# Patient Record
Sex: MALE | Race: WHITE | NOT HISPANIC OR LATINO | Employment: FULL TIME | URBAN - METROPOLITAN AREA
[De-identification: names, ages, dates, MRNs, and addresses within clinical notes are randomized per-mention and may not be internally consistent; named-entity substitution may affect disease eponyms.]

---

## 2017-01-16 ENCOUNTER — TELEPHONE (OUTPATIENT)
Dept: NEUROSURGERY | Facility: CLINIC | Age: 62
End: 2017-01-16

## 2017-01-16 NOTE — TELEPHONE ENCOUNTER
Recall 1Y CT angiogram head and neck for CVA.  Call to pt home and left message 11/23/16 at 2:26pm.  No return call made from patient, so letter mailed to pt on 12/20/16 to call to schedule. No response or appt made to date. No contacts listed in either system for back up.

## 2017-01-16 NOTE — LETTER
1/18/2017     VIA CERTIFIED MAIL    Khadar Vann  [address] [place] [address]    Dear Mr. Vann,    You have a serious medical condition: bilateral carotid stenosis that has been treated surgically. It was recommended for you to undergo repeat testing to continue to observe this for any changes. My staff has attempted to contact you on multiple occasions to arrange this important follow-up but you have not responded.    I continue to recommend follow-up in my office with repeat imaging.    It is my job as your neurosurgeon, once a diagnosis like this has been made, to watch a patient carefully for a period of time to ensure their continued good health and be able to treat any changes as they arise. I do this for all of my patients who carry a similar diagnosis. In the end, it is always the patient’s choice about how they manage their health and I certainly respect your decision to refrain from further testing and follow-up.  If you prefer not to continue neurosurgical care with me, please place yourself under the care of another physician without delay. If you have decided to obtain care with another provider, please let us know and we can help facilitate getting your medical records to your new provider.       Sincerely,           Uzair Odom M.D.  IRINA/crow

## 2017-02-13 ENCOUNTER — TELEPHONE (OUTPATIENT)
Dept: NEUROSURGERY | Facility: CLINIC | Age: 62
End: 2017-02-13

## 2017-02-13 DIAGNOSIS — Z86.73 HISTORY OF STROKE: ICD-10-CM

## 2017-02-13 DIAGNOSIS — I65.23 BILATERAL CAROTID ARTERY STENOSIS: Primary | ICD-10-CM

## 2017-02-13 NOTE — TELEPHONE ENCOUNTER
----- Message from Daisy Hobson sent at 2/8/2017  2:08 PM EST -----  Regarding: Recall  We sent this patient a [facility] letter to get his recall scheduled: Recall 1Y CT angiogram head and neck for CVA.     There is no order in chart - can you please enter so I can go ahead and schedule this patient's test and FU.

## 2017-04-07 ENCOUNTER — OFFICE VISIT (OUTPATIENT)
Dept: NEUROSURGERY | Facility: CLINIC | Age: 62
End: 2017-04-07

## 2017-04-07 ENCOUNTER — HOSPITAL ENCOUNTER (OUTPATIENT)
Dept: CT IMAGING | Facility: HOSPITAL | Age: 62
Discharge: HOME | End: 2017-04-07
Admitting: NURSE PRACTITIONER

## 2017-04-07 VITALS
SYSTOLIC BLOOD PRESSURE: 112 MMHG | WEIGHT: 237 LBS | BODY MASS INDEX: 33.93 KG/M2 | HEIGHT: 70 IN | HEART RATE: 76 BPM | DIASTOLIC BLOOD PRESSURE: 60 MMHG | RESPIRATION RATE: 16 BRPM

## 2017-04-07 DIAGNOSIS — I65.23 BILATERAL CAROTID ARTERY STENOSIS: ICD-10-CM

## 2017-04-07 DIAGNOSIS — Z86.73 HISTORY OF STROKE: ICD-10-CM

## 2017-04-07 DIAGNOSIS — I65.23 ASYMPTOMATIC CAROTID ARTERY NARROWING WITHOUT INFARCTION, BILATERAL: Primary | ICD-10-CM

## 2017-04-07 DIAGNOSIS — I70.8 LEFT SUBCLAVIAN ARTERY OCCLUSION: ICD-10-CM

## 2017-04-07 PROBLEM — I65.29 ASYMPTOMATIC CAROTID ARTERY NARROWING WITHOUT INFARCTION: Status: ACTIVE | Noted: 2017-04-07

## 2017-04-07 LAB — CREAT BLDA-MCNC: 1.8 MG/DL (ref 0.6–1.3)

## 2017-04-07 PROCEDURE — 99213 OFFICE O/P EST LOW 20 MIN: CPT | Performed by: NEUROLOGICAL SURGERY

## 2017-04-07 PROCEDURE — 82565 ASSAY OF CREATININE: CPT

## 2017-04-07 PROCEDURE — 70498 CT ANGIOGRAPHY NECK: CPT

## 2017-04-07 PROCEDURE — 70496 CT ANGIOGRAPHY HEAD: CPT

## 2017-04-07 PROCEDURE — 0 IOPAMIDOL PER 1 ML: Performed by: NURSE PRACTITIONER

## 2017-04-07 RX ORDER — ASPIRIN 81 MG/1
81 TABLET ORAL DAILY
COMMUNITY

## 2017-04-07 RX ORDER — SPIRONOLACTONE AND HYDROCHLOROTHIAZIDE 25; 25 MG/1; MG/1
1 TABLET ORAL DAILY
Refills: 0 | COMMUNITY
Start: 2017-03-17 | End: 2021-05-03

## 2017-04-07 RX ORDER — PANTOPRAZOLE SODIUM 40 MG/1
40 TABLET, DELAYED RELEASE ORAL DAILY
COMMUNITY
Start: 2017-03-25

## 2017-04-07 RX ORDER — AMLODIPINE BESYLATE AND BENAZEPRIL HYDROCHLORIDE 5; 20 MG/1; MG/1
CAPSULE ORAL
COMMUNITY
Start: 2017-03-23 | End: 2021-01-15 | Stop reason: SDUPTHER

## 2017-04-07 RX ORDER — BISOPROLOL FUMARATE AND HYDROCHLOROTHIAZIDE 5; 6.25 MG/1; MG/1
1 TABLET ORAL DAILY
COMMUNITY
Start: 2017-03-25 | End: 2021-02-26 | Stop reason: SDUPTHER

## 2017-04-07 RX ORDER — ROSUVASTATIN CALCIUM 20 MG/1
20 TABLET, COATED ORAL DAILY
COMMUNITY
Start: 2017-03-23 | End: 2021-02-26 | Stop reason: SDUPTHER

## 2017-04-07 RX ADMIN — IOPAMIDOL 95 ML: 755 INJECTION, SOLUTION INTRAVENOUS at 09:30

## 2017-04-07 NOTE — PROGRESS NOTES
Subjective   Patient ID: Khadar Vann is a 62 y.o. male is here today for follow-up of bilateral carotid stenosis s/p (R) CEA 3-31-10 and (L) CEA with subclavian by-pass 4-26-10. He is unaccompanied.    History of Present Illness     62 year old gentleman who returns the office now approximately 7 years following bilateral carotid artery endarterectomies and a left carotid to subclavian artery bypass in April 2010.  Clinically he remains essentially unchanged.  He has had a disturbing sensation of near syncope when straining at stool.  He is not yet completely passed out but he comes close.  With straining he feels dizzy.  He has also seen sparkling lights when this has happened.  He also gets a sense of the same feeling when he stretches his arms.:  Pulling and tugging also causes a problem.    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      The following portions of the patient's history were reviewed and updated as appropriate: allergies, current medications, past family history, past medical history, past social history, past surgical history and problem list.    Review of Systems   Cardiovascular: Negative for chest pain and palpitations.   Musculoskeletal: Negative for gait problem.   Neurological: Positive for light-headedness (with valsalva maneuvers - feels like he is going to pass out). Negative for dizziness, speech difficulty, weakness and headaches.   Psychiatric/Behavioral: Negative for confusion.       Objective   Physical Exam   Constitutional: He is oriented to person, place, and time. He appears well-developed and well-nourished. He is cooperative.   HENT:   Head: Normocephalic and atraumatic.   Eyes: EOM are normal. Pupils are equal, round, and reactive to light. No scleral icterus.   Neck: Normal range of motion. Neck supple.   Cardiovascular: Normal rate, regular rhythm and intact distal pulses.    No murmur heard.  Pulses:       Carotid pulses are 2+ on the right  side, and 2+ on the left side.       Radial pulses are 0 on the left side.        Femoral pulses are 2+ on the right side, and 2+ on the left side.       Popliteal pulses are 1+ on the right side, and 1+ on the left side.   Pulmonary/Chest: Effort normal and breath sounds normal.   Abdominal: Soft. Bowel sounds are normal. There is no tenderness.   Musculoskeletal: Normal range of motion.   Neurological: He is alert and oriented to person, place, and time. He has normal strength. He displays no atrophy. No cranial nerve deficit or sensory deficit. He exhibits normal muscle tone. He displays a negative Romberg sign. Coordination and gait normal. GCS eye subscore is 4. GCS verbal subscore is 5. GCS motor subscore is 6.   Reflex Scores:       Tricep reflexes are 2+ on the right side and 2+ on the left side.       Bicep reflexes are 2+ on the right side and 2+ on the left side.       Brachioradialis reflexes are 2+ on the right side and 2+ on the left side.       Patellar reflexes are 2+ on the right side and 2+ on the left side.       Achilles reflexes are 2+ on the right side and 2+ on the left side.  Negative Grant and clonus  Finger to nose intact   Heel to shin intact  Able to tandem walk  Able to walk on heels and toes   Skin: Skin is warm, dry and intact.   Psychiatric: He has a normal mood and affect. His speech is normal and behavior is normal. Judgment and thought content normal. Cognition and memory are normal.   Vitals reviewed.    Neurologic Exam     Mental Status   Oriented to person, place, and time.   Speech: speech is normal     Cranial Nerves     CN III, IV, VI   Pupils are equal, round, and reactive to light.  Extraocular motions are normal.     Motor Exam     Strength   Strength 5/5 throughout.     Gait, Coordination, and Reflexes     Reflexes   Right brachioradialis: 2+  Left brachioradialis: 2+  Right biceps: 2+  Left biceps: 2+  Right triceps: 2+  Left triceps: 2+  Right patellar: 2+  Left  patellar: 2+  Right achilles: 2+  Left achilles: 2+      Assessment/Plan   Independent Review of Radiographic Studies:    I personally reviewed the CTA of the head and neck done today.  This demonstrates minimal carotid stenosis bilaterally.  It also demonstrates the presence of the left carotid to subclavian graft which remains occluded.  There is distal filling of the subclavian artery.  Medical Decision Making:    The patient returns to the office with the above-noted history and physical findings.  He is undergone successful carotid artery surgery with satisfactory revascularization and no evidence of significant restenosis at this time.  Unfortunately the left carotid to subclavian graft is likely nonfunctional or at least only poorly functional since he continues to have symptoms in the left upper extremity.    He has a more disturbing symptoms as described above of near syncope.  I have suggested that he be in contact with his cardiologist and we will go ahead and make an appointment for him while he is here for further evaluation of these symptoms.    I recommended that he follow-up here in 2 years with a carotid ultrasound tests rather than further CTA imaging.  I don't think that he requires further radiation unless he were to develop other symptoms.      Khadar was seen today for carotid stenosis follow-up.    Diagnoses and all orders for this visit:    Asymptomatic carotid artery narrowing without infarction, bilateral  -     Duplex Carotid Ultrasound CAR; Future    Left subclavian artery occlusion  -     Duplex Carotid Ultrasound CAR; Future      Return in about 2 years (around 4/7/2019).

## 2019-02-15 ENCOUNTER — TELEPHONE (OUTPATIENT)
Dept: NEUROSURGERY | Facility: CLINIC | Age: 64
End: 2019-02-15

## 2019-02-15 DIAGNOSIS — I65.23 ASYMPTOMATIC STENOSIS OF BOTH CAROTID ARTERIES WITHOUT INFARCTION: Primary | ICD-10-CM

## 2019-02-15 NOTE — TELEPHONE ENCOUNTER
Due in April for 2Y recall carotid stenosis with carotid doppler. Old  order deleted, new order in, old records scanned, old imaging loaded.    Recall letter sent.

## 2019-02-19 NOTE — TELEPHONE ENCOUNTER
Pt is scheduled for 4/9 for his doppler and his f/u.   Pt called and said he wants to do his carotid doppler and see Dr Odom the same day. Pt wants to get his results the same day.

## 2019-04-09 ENCOUNTER — APPOINTMENT (OUTPATIENT)
Dept: CARDIOLOGY | Facility: HOSPITAL | Age: 64
End: 2019-04-09

## 2019-07-09 ENCOUNTER — OFFICE VISIT (OUTPATIENT)
Dept: NEUROSURGERY | Facility: CLINIC | Age: 64
End: 2019-07-09

## 2019-07-09 VITALS
HEART RATE: 56 BPM | RESPIRATION RATE: 16 BRPM | SYSTOLIC BLOOD PRESSURE: 110 MMHG | DIASTOLIC BLOOD PRESSURE: 64 MMHG | HEIGHT: 70 IN | WEIGHT: 227 LBS | BODY MASS INDEX: 32.5 KG/M2

## 2019-07-09 DIAGNOSIS — I65.23 ASYMPTOMATIC STENOSIS OF BOTH CAROTID ARTERIES WITHOUT INFARCTION: Primary | ICD-10-CM

## 2019-07-09 DIAGNOSIS — I70.8 LEFT SUBCLAVIAN ARTERY OCCLUSION: ICD-10-CM

## 2019-07-09 DIAGNOSIS — I63.411 CEREBROVASCULAR ACCIDENT (CVA) DUE TO EMBOLISM OF RIGHT MIDDLE CEREBRAL ARTERY (HCC): ICD-10-CM

## 2019-07-09 DIAGNOSIS — I65.23 ASYMPTOMATIC STENOSIS OF BOTH CAROTID ARTERIES WITHOUT INFARCTION: ICD-10-CM

## 2019-07-09 PROBLEM — I63.9 STROKE (HCC): Status: RESOLVED | Noted: 2019-07-09 | Resolved: 2019-07-09

## 2019-07-09 PROCEDURE — 99213 OFFICE O/P EST LOW 20 MIN: CPT | Performed by: NEUROLOGICAL SURGERY

## 2019-07-09 NOTE — PROGRESS NOTES
"Subjective   Patient ID: Khadar Vann is a 64 y.o. male is here today for follow-up of 2yr recall carotid stenosis .  Pt is unaccompanied.    History of Present Illness     He returns to the office today for ongoing follow-up with history of bilateral carotid stenosis status post right carotid endarterectomy in March 2010.  He then underwent a left carotid endarterectomy with subclavian bypass in April 2010.  He presents with a new carotid ultrasound obtained at [facility] on June 4, 2018.    He denies any new issues at this time.  He reports occasional dizziness after working prolonged hours.  He denies any double or blurred vision, headaches or lightheadedness.  He denies any strokelike symptoms.  He does have some residual weakness in the left upper extremity and occasional swelling in the left arm.  He continues to work full-time at [employer].  He states he has lost approximately 15 pounds in the past few months due to walking more at work.     He presents unaccompanied.       /64 (BP Location: Right arm, Patient Position: Sitting, Cuff Size: Large Adult)   Pulse 56   Resp 16   Ht 177.8 cm (70\")   Wt 103 kg (227 lb)   BMI 32.57 kg/m²     The following portions of the patient's history were reviewed and updated as appropriate: allergies, current medications, past family history, past medical history, past social history, past surgical history and problem list.    Review of Systems   HENT: Positive for tinnitus.    Eyes: Negative for visual disturbance.   Neurological: Positive for dizziness, light-headedness and numbness (L arm/hand). Negative for syncope, facial asymmetry and headaches.       Objective   Physical Exam   Constitutional: He is oriented to person, place, and time. Vital signs are normal. He appears well-developed and well-nourished. He is cooperative.  Non-toxic appearance. He does not have a sickly appearance.   HENT:   Head: Atraumatic.   Eyes: EOM are " normal. Pupils are equal, round, and reactive to light.   Neck: Normal range of motion. Neck supple. Carotid bruit is present (Bilateral). No tracheal deviation present.   Well-healed bilateral carotid incision site   Cardiovascular: Intact distal pulses.   Pulmonary/Chest: Effort normal.   Abdominal:   Inguinal hernia   Musculoskeletal: Normal range of motion. He exhibits no deformity.   Moving all extremities well   Neurological: He is alert and oriented to person, place, and time. He has normal strength. He displays no tremor. No cranial nerve deficit or sensory deficit. He exhibits normal muscle tone. Coordination and gait normal. GCS eye subscore is 4. GCS verbal subscore is 5. GCS motor subscore is 6.   Gait is stable and upright, able to heel and toe walk, able to tandem  Cranial nerves II through XII grossly intact   Skin: Skin is warm and dry.   Psychiatric: He has a normal mood and affect. His behavior is normal. Thought content normal.   Vitals reviewed.    Neurologic Exam     Mental Status   Oriented to person, place, and time.     Cranial Nerves     CN III, IV, VI   Pupils are equal, round, and reactive to light.  Extraocular motions are normal.     Motor Exam     Strength   Strength 5/5 throughout.       Assessment/Plan   Independent Review of Radiographic Studies:    I reviewed the carotid duplex evaluation demonstrates about 40% right internal carotid artery stenosis and about 40 to 60% left internal carotid artery stenosis.  This is unchanged from prior duplex evaluation.  Medical Decision Making:    I confirmed and obtained the above history as recorded by the nurse practitioner acting as a scribe. I performed the above examination and it is documented by the nurse practitioner acting as a scribe.    This very pleasant patient returns the office today thankfully clinically unchanged.  He has noted some very mild memory dysfunction and at this point he does not really want to do anything about that  more specific.    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    I plan on seeing him in 2 years with a CTA of the head and neck to confirm that everything looks as good as it did 2 years ago.    From a cerebrovascular standpoint I believe that he is low risk for a simple hernia repair.  I do think that care should be taken to maintain blood pressure throughout the operative intervention.    Khadar was seen today for 2yr recall carotid stenosis.    Diagnoses and all orders for this visit:    Asymptomatic stenosis of both carotid arteries without infarction  -     CT Angiogram Neck With & Without Contrast; Future    Left subclavian artery occlusion  -     CT Angiogram Neck With & Without Contrast; Future    Cerebrovascular accident (CVA) due to embolism of right middle cerebral artery (CMS/HCC)  -     CT Angiogram Head With Contrast; Future      Return in about 2 years (around 7/9/2021) for Follow up after testing.

## 2021-01-15 RX ORDER — AMLODIPINE BESYLATE AND BENAZEPRIL HYDROCHLORIDE 5; 20 MG/1; MG/1
1 CAPSULE ORAL DAILY
Qty: 30 CAPSULE | Refills: 0 | Status: SHIPPED | OUTPATIENT
Start: 2021-01-15 | End: 2021-02-26 | Stop reason: SDUPTHER

## 2021-01-15 NOTE — TELEPHONE ENCOUNTER
Dr. Serrano:     Last Office Visit: 07/10/2020 ( Tamie )  Last Labs: 01/30/2020  Next Lab Visit: -   Next Office Visit: -      :  Please book patient for a follow up appointment for labs AND office visit.

## 2021-01-19 ENCOUNTER — OFFICE VISIT (OUTPATIENT)
Dept: CARDIOLOGY | Facility: CLINIC | Age: 66
End: 2021-01-19

## 2021-01-19 VITALS
TEMPERATURE: 98 F | DIASTOLIC BLOOD PRESSURE: 64 MMHG | BODY MASS INDEX: 32.2 KG/M2 | HEIGHT: 71 IN | HEART RATE: 62 BPM | WEIGHT: 230 LBS | RESPIRATION RATE: 16 BRPM | OXYGEN SATURATION: 96 % | SYSTOLIC BLOOD PRESSURE: 120 MMHG

## 2021-01-19 DIAGNOSIS — I65.23 BILATERAL CAROTID ARTERY STENOSIS: Primary | ICD-10-CM

## 2021-01-19 DIAGNOSIS — E78.5 HYPERLIPIDEMIA LDL GOAL <100: ICD-10-CM

## 2021-01-19 DIAGNOSIS — I70.8 LEFT SUBCLAVIAN ARTERY OCCLUSION: ICD-10-CM

## 2021-01-19 DIAGNOSIS — I10 ESSENTIAL HYPERTENSION: ICD-10-CM

## 2021-01-19 PROBLEM — I77.9 CAROTID DISEASE, BILATERAL: Status: ACTIVE | Noted: 2017-04-07

## 2021-01-19 PROCEDURE — 93000 ELECTROCARDIOGRAM COMPLETE: CPT | Performed by: INTERNAL MEDICINE

## 2021-01-19 PROCEDURE — 99214 OFFICE O/P EST MOD 30 MIN: CPT | Performed by: INTERNAL MEDICINE

## 2021-01-19 NOTE — PROGRESS NOTES
[facility] [place]  [facility] CARDIOLOGY  [address] [place] [address]  Dept: [phone]  Dept Fax: [phone]    Khadar Vann  1955    Follow Up Office Visit Note    History of Present Illness:  Khadar Vann is a 65 y.o. male who presents to the clinic for Hypertension. The BP seems fine 120.70 on Amlodipine benazepril 5.20. Bisopril Hctz 5/6.25 and Aldactizide 25/25., will keep same meds get lab today, he has CRF last creatinine was 1,8 in 2018    The following portions of the patient's history were reviewed and updated as appropriate: allergies, current medications, past family history, past medical history, past social history, past surgical history and problem list.    Medications:  amLODIPine-benazepril  aspirin  bisoprolol-hydrochlorothiazide  pantoprazole  rosuvastatin  spironolactone-hydrochlorothiazide    Subjective  No Known Allergies     Past Medical History:   Diagnosis Date   • Atherosclerosis of arteries of extremities (CMS/HCC)    • Benign essential hypertension    • Bilateral carotid artery stenosis    • Body mass index (BMI) 33.0-33.9, adult    • Diabetes (CMS/HCC)    • Dyslipidemia    • GERD (gastroesophageal reflux disease)    • Hyperlipidemia    • Hypertension    • Impaired fasting glucose    • Mixed hypercholesterolemia and hypertriglyceridemia    • Multiple and bilateral precerebral arterial occlusion    • Multiple and bilateral precerebral artery stenosis    • Occlusion and stenosis of bilateral carotid arteries    • Peripheral vascular disease (CMS/HCC)    • Pure hypercholesterolemia    • Stroke (CMS/HCC) 2010   • Tobacco abuse        Past Surgical History:   Procedure Laterality Date   • CAROTID ENDARTERECTOMY Right 03/31/2010   • CAROTID ENDARTERECTOMY Left 04/26/2010    with subclavian by-pass   • SHOULDER ARTHROSCOPY Left        Family History   Problem Relation Age of Onset   • Heart disease Father    • Hypertension Father    • Diabetes  "Father    • Cancer Brother         lung   • Diabetes Sister         Social History     Socioeconomic History   • Marital status:      Spouse name: Not on file   • Number of children: Not on file   • Years of education: Not on file   • Highest education level: Not on file   Occupational History   • Occupation: line set up     Comment: full time   Tobacco Use   • Smoking status: Former Smoker     Quit date:      Years since quittin.0   • Smokeless tobacco: Never Used   Substance and Sexual Activity   • Alcohol use: No   • Drug use: No   • Sexual activity: Defer       Review of Systems   Constitutional: Negative.    HENT: Negative.    Respiratory: Negative.    Cardiovascular: Negative.    Endocrine: Negative.    Genitourinary: Negative.    Musculoskeletal: Negative.    Skin: Negative.    Allergic/Immunologic: Negative.    Neurological: Negative.    Hematological: Negative.    Psychiatric/Behavioral: Negative.        Cardiovascular Procedures    ECHO/MUGA: ECHOCARDIOGRAM - SCAN - [id number]- (2021)    STRESS TESTS:   CARDIAC CATH:   DEVICES:   HOLTER:   CT/MRI:   VASCULAR: CARDIOLOGY RESULTS - SCAN - [id number]- (2021)    CARDIOTHORACIC:     Objective  Vitals:    21 1439   BP: 120/64   BP Location: Right arm   Patient Position: Lying   Cuff Size: Large Adult   Pulse: 62   Resp: 16   Temp: 98 °F (36.7 °C)   TempSrc: Infrared   SpO2: 96%   Weight: 104 kg (230 lb)   Height: 180.3 cm (71\")   PainSc: 0-No pain     Body mass index is 32.08 kg/m².     Physical Exam  Cardiovascular:      PMI at left midclavicular line. Normal rate. Regular rhythm. Normal S1. Normal S2.      Murmurs: There is no murmur.      No gallop. No click. No rub.   Pulses:     Intact distal pulses.   Edema:     Peripheral edema absent.          Diagnostic Data    ECG 12 Lead    Date/Time: 2021 3:37 PM  Performed by: Geo Serrano MD  Authorized by: Geo Serrano MD   Previous " ECG: no previous ECG available  Rhythm: sinus rhythm  Rate: normal  BPM: 63  QRS axis: normal  Other findings: low voltage            Assessment and Plan  Diagnoses and all orders for this visit:    Bilateral carotid artery stenosis= bilateral carotid endarterectomy in 2010 and recent doppler less than 40 % on ASA    Essential hypertension- BP seems fine, will keep Amlodipine benazepril 5.,20. Bisoprolol 5.,625 daily and also Aldactizyde,     Hyperlipidemia LDL goal <100- on Crestor 20 mg    Left subclavian artery occlusion- he seems he has bypass from  Carotid to left brachial artery          Return in about 6 months (around 7/19/2021) for Recheck.    Geo Serrano MD  01/19/2021

## 2021-01-20 LAB
BASOPHILS # BLD AUTO: 0.1 X10E3/UL (ref 0–0.2)
BASOPHILS NFR BLD AUTO: 1 %
EOSINOPHIL # BLD AUTO: 0.2 X10E3/UL (ref 0–0.4)
EOSINOPHIL NFR BLD AUTO: 2 %
ERYTHROCYTE [DISTWIDTH] IN BLOOD BY AUTOMATED COUNT: 13 % (ref 11.6–15.4)
HCT VFR BLD AUTO: 43.1 % (ref 37.5–51)
HGB BLD-MCNC: 14.6 G/DL (ref 13–17.7)
IMM GRANULOCYTES # BLD AUTO: 0 X10E3/UL (ref 0–0.1)
IMM GRANULOCYTES NFR BLD AUTO: 0 %
LYMPHOCYTES # BLD AUTO: 1.8 X10E3/UL (ref 0.7–3.1)
LYMPHOCYTES NFR BLD AUTO: 17 %
Lab: NORMAL
MCH RBC QN AUTO: 28.6 PG (ref 26.6–33)
MCHC RBC AUTO-ENTMCNC: 33.9 G/DL (ref 31.5–35.7)
MCV RBC AUTO: 84 FL (ref 79–97)
MONOCYTES # BLD AUTO: 0.8 X10E3/UL (ref 0.1–0.9)
MONOCYTES NFR BLD AUTO: 8 %
NEUTROPHILS # BLD AUTO: 7.2 X10E3/UL (ref 1.4–7)
NEUTROPHILS NFR BLD AUTO: 72 %
PLATELET # BLD AUTO: 299 X10E3/UL (ref 150–450)
RBC # BLD AUTO: 5.11 X10E6/UL (ref 4.14–5.8)
WBC # BLD AUTO: 10.1 X10E3/UL (ref 3.4–10.8)

## 2021-01-21 LAB
ALBUMIN SERPL-MCNC: 4.7 G/DL (ref 3.8–4.8)
ALBUMIN/GLOB SERPL: 1.7 {RATIO} (ref 1.2–2.2)
ALP SERPL-CCNC: 78 IU/L (ref 39–117)
ALT SERPL-CCNC: 11 IU/L (ref 0–44)
AST SERPL-CCNC: 20 IU/L (ref 0–40)
BILIRUB SERPL-MCNC: <0.2 MG/DL (ref 0–1.2)
BUN SERPL-MCNC: 26 MG/DL (ref 8–27)
BUN/CREAT SERPL: 14 (ref 10–24)
CALCIUM SERPL-MCNC: 10.6 MG/DL (ref 8.6–10.2)
CHLORIDE SERPL-SCNC: 103 MMOL/L (ref 96–106)
CO2 SERPL-SCNC: 20 MMOL/L (ref 20–29)
CREAT SERPL-MCNC: 1.84 MG/DL (ref 0.76–1.27)
GLOBULIN SER CALC-MCNC: 2.7 G/DL (ref 1.5–4.5)
GLUCOSE SERPL-MCNC: 89 MG/DL (ref 65–99)
POTASSIUM SERPL-SCNC: 4.8 MMOL/L (ref 3.5–5.2)
PROT SERPL-MCNC: 7.4 G/DL (ref 6–8.5)
SODIUM SERPL-SCNC: 141 MMOL/L (ref 134–144)
WRITTEN AUTHORIZATION: NORMAL

## 2021-01-22 ENCOUNTER — TELEPHONE (OUTPATIENT)
Dept: CARDIOLOGY | Facility: CLINIC | Age: 66
End: 2021-01-22

## 2021-02-12 DIAGNOSIS — I10 ESSENTIAL HYPERTENSION: ICD-10-CM

## 2021-02-12 DIAGNOSIS — E78.5 HYPERLIPIDEMIA LDL GOAL <100: Primary | ICD-10-CM

## 2021-02-12 RX ORDER — ROSUVASTATIN CALCIUM 20 MG/1
20 TABLET, COATED ORAL DAILY
Qty: 90 TABLET | Refills: 3 | Status: SHIPPED | OUTPATIENT
Start: 2021-02-12 | End: 2021-08-05

## 2021-02-26 DIAGNOSIS — E78.5 HYPERLIPIDEMIA LDL GOAL <100: ICD-10-CM

## 2021-02-26 DIAGNOSIS — I10 ESSENTIAL HYPERTENSION: Primary | ICD-10-CM

## 2021-02-26 RX ORDER — AMLODIPINE BESYLATE AND BENAZEPRIL HYDROCHLORIDE 5; 20 MG/1; MG/1
1 CAPSULE ORAL DAILY
Qty: 90 CAPSULE | Refills: 3 | Status: SHIPPED | OUTPATIENT
Start: 2021-02-26 | End: 2022-02-18

## 2021-02-26 RX ORDER — ROSUVASTATIN CALCIUM 20 MG/1
20 TABLET, COATED ORAL DAILY
Qty: 90 TABLET | Refills: 3 | Status: SHIPPED | OUTPATIENT
Start: 2021-02-26 | End: 2021-08-09 | Stop reason: SDUPTHER

## 2021-02-26 RX ORDER — BISOPROLOL FUMARATE AND HYDROCHLOROTHIAZIDE 5; 6.25 MG/1; MG/1
1 TABLET ORAL DAILY
Qty: 90 TABLET | Refills: 3 | Status: SHIPPED | OUTPATIENT
Start: 2021-02-26 | End: 2022-02-18

## 2021-02-26 NOTE — TELEPHONE ENCOUNTER
HE SAID HE GOT THE MESSAGE TO CALL HIS PHARMACY, BUT THEY HAVE MADE MULTIPLE ATTEMPTS, AND HE HAS MADE MULTIPLE ATTEMPTS TO GET NEW PRESCRIPTION REFILLS, AND THEY STILL HAVEN'T BEEN SENT,  90 DAYS ON BISOPROLOL 5MG, CRESTOR 20MG, AND AMLODIPINE-BENZAPRIL 5/20 TO [facility]

## 2021-03-05 ENCOUNTER — TELEPHONE (OUTPATIENT)
Dept: CARDIOLOGY | Facility: CLINIC | Age: 66
End: 2021-03-05

## 2021-03-05 NOTE — TELEPHONE ENCOUNTER
[facility] CALLING TO GET CARDIAC CLEARANCE FOR OPEN UMBILICAL HERNIA REPAIR ....  PLEASE CALL [facility] / OTILIA @   [phone]

## 2021-03-05 NOTE — TELEPHONE ENCOUNTER
[facility] CALL[facility] TO GET CARDIAC CLEARANCE FOR OPEN UMBILICAL HERNIA REPAIR . Please advise?

## 2021-03-08 ENCOUNTER — TELEPHONE (OUTPATIENT)
Dept: CARDIOLOGY | Facility: CLINIC | Age: 66
End: 2021-03-08

## 2021-03-08 NOTE — TELEPHONE ENCOUNTER
Sarahi at [facility] calling to get cardiac clearance note from provider.  Need clearance note in order to schedule procedure.  Pls call her back at [phone].

## 2021-05-01 DIAGNOSIS — I70.8 LEFT SUBCLAVIAN ARTERY OCCLUSION: Primary | ICD-10-CM

## 2021-05-03 RX ORDER — SPIRONOLACTONE AND HYDROCHLOROTHIAZIDE 25; 25 MG/1; MG/1
TABLET ORAL
Qty: 90 TABLET | Refills: 3 | Status: SHIPPED | OUTPATIENT
Start: 2021-05-03 | End: 2022-05-09 | Stop reason: SDUPTHER

## 2021-05-20 ENCOUNTER — TELEPHONE (OUTPATIENT)
Dept: NEUROSURGERY | Facility: CLINIC | Age: 66
End: 2021-05-20

## 2021-05-20 DIAGNOSIS — I65.23 BILATERAL CAROTID ARTERY STENOSIS: Primary | ICD-10-CM

## 2021-07-21 ENCOUNTER — TELEPHONE (OUTPATIENT)
Dept: NEUROSURGERY | Facility: CLINIC | Age: 66
End: 2021-07-21

## 2021-08-05 ENCOUNTER — TELEPHONE (OUTPATIENT)
Dept: CARDIOLOGY | Facility: CLINIC | Age: 66
End: 2021-08-05

## 2021-08-05 ENCOUNTER — OFFICE VISIT (OUTPATIENT)
Dept: CARDIOLOGY | Facility: CLINIC | Age: 66
End: 2021-08-05

## 2021-08-05 VITALS
WEIGHT: 230 LBS | RESPIRATION RATE: 15 BRPM | DIASTOLIC BLOOD PRESSURE: 68 MMHG | HEART RATE: 59 BPM | OXYGEN SATURATION: 97 % | TEMPERATURE: 97.1 F | HEIGHT: 71 IN | SYSTOLIC BLOOD PRESSURE: 116 MMHG | BODY MASS INDEX: 32.2 KG/M2

## 2021-08-05 DIAGNOSIS — I70.8 LEFT SUBCLAVIAN ARTERY OCCLUSION: ICD-10-CM

## 2021-08-05 DIAGNOSIS — E78.5 HYPERLIPIDEMIA LDL GOAL <100: ICD-10-CM

## 2021-08-05 DIAGNOSIS — I10 ESSENTIAL HYPERTENSION: ICD-10-CM

## 2021-08-05 DIAGNOSIS — I65.23 BILATERAL CAROTID ARTERY STENOSIS: Primary | ICD-10-CM

## 2021-08-05 PROCEDURE — 99214 OFFICE O/P EST MOD 30 MIN: CPT | Performed by: INTERNAL MEDICINE

## 2021-08-05 NOTE — PROGRESS NOTES
[facility] [place]  [facility] CARDIOLOGY  [address] [place] [address]  Dept: [phone]  Dept Fax: [phone]    Khadar Vann  1955    Follow Up Office Visit Note    History of Present Illness:  Khadar Vann is a 66 y.o. male who presents to the clinic for Hypertension - the BP is good 120.60 on Aldactazide 25/25,Bisoprolol 6.,625 daily and Amlodipine benazepril 5,20, daily, denies any complaints     The following portions of the patient's history were reviewed and updated as appropriate: allergies, current medications, past family history, past medical history, past social history, past surgical history and problem list.    Medications:  amLODIPine-benazepril  aspirin  bisoprolol-hydrochlorothiazide  pantoprazole  rosuvastatin  spironolactone-hydrochlorothiazide    Subjective  No Known Allergies     Past Medical History:   Diagnosis Date   • Atherosclerosis of arteries of extremities (CMS/HCC)    • Benign essential hypertension    • Bilateral carotid artery stenosis    • Body mass index (BMI) 33.0-33.9, adult    • Diabetes (CMS/HCC)    • Dyslipidemia    • GERD (gastroesophageal reflux disease)    • Hyperlipidemia    • Hypertension    • Impaired fasting glucose    • Mixed hypercholesterolemia and hypertriglyceridemia    • Multiple and bilateral precerebral arterial occlusion    • Multiple and bilateral precerebral artery stenosis    • Occlusion and stenosis of bilateral carotid arteries    • Peripheral vascular disease (CMS/HCC)    • Pure hypercholesterolemia    • Stroke (CMS/HCC) 2010   • Tobacco abuse        Past Surgical History:   Procedure Laterality Date   • CAROTID ENDARTERECTOMY Right 03/31/2010   • CAROTID ENDARTERECTOMY Left 04/26/2010    with subclavian by-pass   • SHOULDER ARTHROSCOPY Left        Family History   Problem Relation Age of Onset   • Heart disease Father    • Hypertension Father    • Diabetes Father    • Cancer Brother         lung   •  "Diabetes Sister         Social History     Socioeconomic History   • Marital status:      Spouse name: Not on file   • Number of children: Not on file   • Years of education: Not on file   • Highest education level: Not on file   Tobacco Use   • Smoking status: Former Smoker     Quit date:      Years since quittin.6   • Smokeless tobacco: Never Used   Substance and Sexual Activity   • Alcohol use: No   • Drug use: No   • Sexual activity: Defer       Review of Systems   Constitutional: Negative.    HENT: Negative.    Respiratory: Negative.    Cardiovascular: Negative.    Endocrine: Negative.    Genitourinary: Negative.    Musculoskeletal: Negative.    Skin: Negative.    Allergic/Immunologic: Negative.    Neurological: Negative.    Hematological: Negative.    Psychiatric/Behavioral: Negative.        Cardiovascular Procedures    ECHO/MUGA:   STRESS TESTS:   CARDIAC CATH:   DEVICES:   HOLTER:   CT/MRI:   VASCULAR:   CARDIOTHORACIC:     Objective  Vitals:    21 1457   BP: 116/68   BP Location: Right arm   Patient Position: Sitting   Cuff Size: Adult   Pulse: 59   Resp: 15   Temp: 97.1 °F (36.2 °C)   TempSrc: Infrared   SpO2: 97%   Weight: 104 kg (230 lb)   Height: 180.3 cm (71\")   PainSc: 0-No pain     Body mass index is 32.08 kg/m².     Physical Exam  Constitutional:       Appearance: Healthy appearance. Not in distress.   Neck:      Vascular: No JVR. JVD normal.   Pulmonary:      Effort: Pulmonary effort is normal.      Breath sounds: Normal breath sounds. No wheezing. No rhonchi. No rales.   Chest:      Chest wall: Not tender to palpatation.   Cardiovascular:      PMI at left midclavicular line. Normal rate. Regular rhythm. Normal S1. Normal S2.      Murmurs: There is no murmur.      No gallop. No click. No rub.   Pulses:     Intact distal pulses.   Edema:     Peripheral edema absent.   Abdominal:      General: Bowel sounds are normal.      Palpations: Abdomen is soft.      Tenderness: There is " no abdominal tenderness.   Musculoskeletal: Normal range of motion.         General: No tenderness. Skin:     General: Skin is warm and dry.   Neurological:      General: No focal deficit present.      Mental Status: Alert and oriented to person, place and time.          Diagnostic Data  Procedures    Assessment and Plan  Diagnoses and all orders for this visit:    Bilateral carotid artery stenosis-s/p carotid endarterectomy, bilateral, last est unremarkable     Left subclavian artery occlusions/p bypass left carotid to subclavia    Essential hypertension- the BP is fine 120.60, will keep same meds    Hyperlipidemia LDL goal <100- On Crestor, 20 mg we need lipid [profile          Return in about 6 months (around 2/5/2022) for Recheck.    Geo Serrano MD  08/05/2021

## 2021-08-06 ENCOUNTER — LAB (OUTPATIENT)
Dept: CARDIOLOGY | Facility: CLINIC | Age: 66
End: 2021-08-06

## 2021-08-06 DIAGNOSIS — E78.5 HYPERLIPIDEMIA LDL GOAL <100: ICD-10-CM

## 2021-08-06 DIAGNOSIS — I10 ESSENTIAL HYPERTENSION: Primary | ICD-10-CM

## 2021-08-06 NOTE — TELEPHONE ENCOUNTER
Called pt and let him know that he would need to get another carotid in the next 3-5 years unless something changes.

## 2021-08-07 LAB
ALBUMIN SERPL-MCNC: 4.6 G/DL (ref 3.8–4.8)
ALBUMIN/GLOB SERPL: 2.3 {RATIO} (ref 1.2–2.2)
ALP SERPL-CCNC: 83 IU/L (ref 48–121)
ALT SERPL-CCNC: 10 IU/L (ref 0–44)
AST SERPL-CCNC: 15 IU/L (ref 0–40)
BASOPHILS # BLD AUTO: 0.1 X10E3/UL (ref 0–0.2)
BASOPHILS NFR BLD AUTO: 1 %
BILIRUB SERPL-MCNC: 0.4 MG/DL (ref 0–1.2)
BUN SERPL-MCNC: 29 MG/DL (ref 8–27)
BUN/CREAT SERPL: 19 (ref 10–24)
CALCIUM SERPL-MCNC: 10.1 MG/DL (ref 8.6–10.2)
CHLORIDE SERPL-SCNC: 102 MMOL/L (ref 96–106)
CHOLEST SERPL-MCNC: 136 MG/DL (ref 100–199)
CK SERPL-CCNC: 52 U/L (ref 41–331)
CO2 SERPL-SCNC: 25 MMOL/L (ref 20–29)
CREAT SERPL-MCNC: 1.52 MG/DL (ref 0.76–1.27)
EOSINOPHIL # BLD AUTO: 0.2 X10E3/UL (ref 0–0.4)
EOSINOPHIL NFR BLD AUTO: 2 %
ERYTHROCYTE [DISTWIDTH] IN BLOOD BY AUTOMATED COUNT: 13.1 % (ref 11.6–15.4)
GLOBULIN SER CALC-MCNC: 2 G/DL (ref 1.5–4.5)
GLUCOSE SERPL-MCNC: 118 MG/DL (ref 65–99)
HCT VFR BLD AUTO: 43 % (ref 37.5–51)
HDLC SERPL-MCNC: 32 MG/DL
HGB BLD-MCNC: 14.2 G/DL (ref 13–17.7)
IMM GRANULOCYTES # BLD AUTO: 0 X10E3/UL (ref 0–0.1)
IMM GRANULOCYTES NFR BLD AUTO: 0 %
LDLC SERPL CALC-MCNC: 77 MG/DL (ref 0–99)
LYMPHOCYTES # BLD AUTO: 1.3 X10E3/UL (ref 0.7–3.1)
LYMPHOCYTES NFR BLD AUTO: 18 %
MCH RBC QN AUTO: 29.1 PG (ref 26.6–33)
MCHC RBC AUTO-ENTMCNC: 33 G/DL (ref 31.5–35.7)
MCV RBC AUTO: 88 FL (ref 79–97)
MONOCYTES # BLD AUTO: 0.6 X10E3/UL (ref 0.1–0.9)
MONOCYTES NFR BLD AUTO: 9 %
NEUTROPHILS # BLD AUTO: 4.9 X10E3/UL (ref 1.4–7)
NEUTROPHILS NFR BLD AUTO: 70 %
PLATELET # BLD AUTO: 251 X10E3/UL (ref 150–450)
POTASSIUM SERPL-SCNC: 4.7 MMOL/L (ref 3.5–5.2)
PROT SERPL-MCNC: 6.6 G/DL (ref 6–8.5)
RBC # BLD AUTO: 4.88 X10E6/UL (ref 4.14–5.8)
SODIUM SERPL-SCNC: 141 MMOL/L (ref 134–144)
TRIGL SERPL-MCNC: 157 MG/DL (ref 0–149)
VLDLC SERPL CALC-MCNC: 27 MG/DL (ref 5–40)
WBC # BLD AUTO: 7.1 X10E3/UL (ref 3.4–10.8)

## 2021-08-09 ENCOUNTER — TELEPHONE (OUTPATIENT)
Dept: CARDIOLOGY | Facility: CLINIC | Age: 66
End: 2021-08-09

## 2021-08-09 DIAGNOSIS — E78.5 HYPERLIPIDEMIA LDL GOAL <100: ICD-10-CM

## 2021-08-09 RX ORDER — ROSUVASTATIN CALCIUM 40 MG/1
40 TABLET, COATED ORAL DAILY
Qty: 90 TABLET | Refills: 1 | Status: SHIPPED | OUTPATIENT
Start: 2021-08-09 | End: 2022-03-04 | Stop reason: SDUPTHER

## 2021-08-09 NOTE — TELEPHONE ENCOUNTER
----- Message from Geo Serrano MD sent at 8/7/2021 10:43 PM EDT -----  Creatinine is better 1,5 from 1,8, also LDL is high 77, we can increase crestor to 40 mg, to get under 70

## 2022-02-07 ENCOUNTER — OFFICE VISIT (OUTPATIENT)
Dept: CARDIOLOGY | Facility: CLINIC | Age: 67
End: 2022-02-07

## 2022-02-07 VITALS
SYSTOLIC BLOOD PRESSURE: 100 MMHG | TEMPERATURE: 97.1 F | RESPIRATION RATE: 15 BRPM | HEART RATE: 63 BPM | HEIGHT: 71 IN | WEIGHT: 230 LBS | BODY MASS INDEX: 32.2 KG/M2 | OXYGEN SATURATION: 95 % | DIASTOLIC BLOOD PRESSURE: 66 MMHG

## 2022-02-07 DIAGNOSIS — E78.5 HYPERLIPIDEMIA LDL GOAL <100: ICD-10-CM

## 2022-02-07 DIAGNOSIS — I70.8 LEFT SUBCLAVIAN ARTERY OCCLUSION: Primary | ICD-10-CM

## 2022-02-07 DIAGNOSIS — I10 ESSENTIAL HYPERTENSION: ICD-10-CM

## 2022-02-07 DIAGNOSIS — I65.23 BILATERAL CAROTID ARTERY STENOSIS: ICD-10-CM

## 2022-02-07 PROCEDURE — 93000 ELECTROCARDIOGRAM COMPLETE: CPT | Performed by: INTERNAL MEDICINE

## 2022-02-07 PROCEDURE — 99214 OFFICE O/P EST MOD 30 MIN: CPT | Performed by: INTERNAL MEDICINE

## 2022-02-07 NOTE — PROGRESS NOTES
[facility] [place]  [facility] CARDIOLOGY  [address] [place] [address]  Dept: [phone]  Dept Fax: [phone]    Khadar Vann  1955    Follow Up Office Visit Note    History of Present Illness:  Khadar Vann is a 66 y.o. male who presents to the clinic for Follow-up carotid artery disease- He denies any complaints, weakness, he has bilateral carotid [id number] and last doppler 6.2020 less than 40% stenosis, he quit smoking 10 years ago on ASA    The following portions of the patient's history were reviewed and updated as appropriate: allergies, current medications, past family history, past medical history, past social history, past surgical history and problem list.    Medications:  amLODIPine-benazepril  aspirin  bisoprolol-hydrochlorothiazide  pantoprazole  rosuvastatin  spironolactone-hydrochlorothiazide    Subjective  No Known Allergies     Past Medical History:   Diagnosis Date   • Atherosclerosis of arteries of extremities (HCC)    • Benign essential hypertension    • Bilateral carotid artery stenosis    • Body mass index (BMI) 33.0-33.9, adult    • Diabetes (HCC)    • Dyslipidemia    • GERD (gastroesophageal reflux disease)    • Hyperlipidemia    • Hypertension    • Impaired fasting glucose    • Mixed hypercholesterolemia and hypertriglyceridemia    • Multiple and bilateral precerebral arterial occlusion    • Multiple and bilateral precerebral artery stenosis    • Occlusion and stenosis of bilateral carotid arteries    • Peripheral vascular disease (HCC)    • Pure hypercholesterolemia    • Stroke ([facility]) 2010   • Tobacco abuse        Past Surgical History:   Procedure Laterality Date   • CAROTID ENDARTERECTOMY Right 03/31/2010   • CAROTID ENDARTERECTOMY Left 04/26/2010    with subclavian by-pass   • SHOULDER ARTHROSCOPY Left        Family History   Problem Relation Age of Onset   • Heart disease Father    • Hypertension Father    • Diabetes Father    •  "Cancer Brother         lung   • Diabetes Sister         Social History     Socioeconomic History   • Marital status:    Tobacco Use   • Smoking status: Former Smoker     Quit date: 2010     Years since quittin.1   • Smokeless tobacco: Never Used   Substance and Sexual Activity   • Alcohol use: No   • Drug use: No   • Sexual activity: Defer       Review of Systems   Constitutional: Negative.    HENT: Negative.    Respiratory: Negative.    Cardiovascular: Negative.    Endocrine: Negative.    Genitourinary: Negative.    Musculoskeletal: Negative.    Skin: Negative.    Allergic/Immunologic: Negative.    Neurological: Negative.    Hematological: Negative.    Psychiatric/Behavioral: Negative.        Cardiovascular Procedures    ECHO/MUGA:   STRESS TESTS:   CARDIAC CATH:   DEVICES:   HOLTER:   CT/MRI:   VASCULAR:   CARDIOTHORACIC:     Objective  Vitals:    22 1421   BP: 100/66   BP Location: Right arm   Patient Position: Sitting   Cuff Size: Large Adult   Pulse: 63   Resp: 15   Temp: 97.1 °F (36.2 °C)   TempSrc: Infrared   SpO2: 95%   Weight: 104 kg (230 lb)   Height: 180.3 cm (71\")   PainSc: 0-No pain     Body mass index is 32.08 kg/m².     Physical Exam  Constitutional:       Appearance: Healthy appearance. Not in distress.   Neck:      Vascular: Carotid bruit present. No JVR. JVD normal.   Pulmonary:      Effort: Pulmonary effort is normal.      Breath sounds: Normal breath sounds. No wheezing. No rhonchi. No rales.   Chest:      Chest wall: Not tender to palpatation.   Cardiovascular:      PMI at left midclavicular line. Normal rate. Regular rhythm. Normal S1. Normal S2.      Murmurs: There is no murmur.      No gallop. No click. No rub.   Pulses:     Intact distal pulses.   Edema:     Peripheral edema absent.   Abdominal:      General: Bowel sounds are normal.      Palpations: Abdomen is soft.      Tenderness: There is no abdominal tenderness.   Musculoskeletal: Normal range of motion.         " General: No tenderness. Skin:     General: Skin is warm and dry.   Neurological:      General: No focal deficit present.      Mental Status: Alert and oriented to person, place and time.          Diagnostic Data    ECG 12 Lead    Date/Time: 2/7/2022 2:53 PM  Performed by: Geo Serrano MD  Authorized by: Geo Serrano MD   Comparison: compared with previous ECG from 1/18/2021  Similar to previous ECG  Rhythm: sinus rhythm  Rate: bradycardic  BPM: 57  QRS axis: normal    Clinical impression: normal ECG            Assessment and Plan  Diagnoses and all orders for this visit:    Left subclavian artery occlusion- No complaints , seems he might have had a bypass carotid to subclavia in the past which was occluded ?     Essential hypertension- BP is 110.60 we might need to back up on his meds on Bisoprolol 5,625, Aldactizide 25.25.Amlodipine bisoprolol 5,20 we might d,c HCTZ and give bisoprolol alone,     Hyperlipidemia LDL goal <100- on Crestor 40 mg, we need Lipid, he will come tomorrow     Bilateral carotid artery stenosis- Less than 40% stenosis  In 2020,          Return in about 6 months (around 8/7/2022) for Recheck.    Geo Serrano MD  02/07/2022

## 2022-02-08 ENCOUNTER — LAB (OUTPATIENT)
Dept: CARDIOLOGY | Facility: CLINIC | Age: 67
End: 2022-02-08

## 2022-02-08 DIAGNOSIS — E78.5 HYPERLIPIDEMIA LDL GOAL <100: ICD-10-CM

## 2022-02-08 DIAGNOSIS — I10 ESSENTIAL HYPERTENSION: Primary | ICD-10-CM

## 2022-02-09 ENCOUNTER — TELEPHONE (OUTPATIENT)
Dept: CARDIOLOGY | Facility: CLINIC | Age: 67
End: 2022-02-09

## 2022-02-09 LAB
ALBUMIN SERPL-MCNC: 4.6 G/DL (ref 3.8–4.8)
ALBUMIN/GLOB SERPL: 1.9 {RATIO} (ref 1.2–2.2)
ALP SERPL-CCNC: 81 IU/L (ref 44–121)
ALT SERPL-CCNC: 13 IU/L (ref 0–44)
AST SERPL-CCNC: 18 IU/L (ref 0–40)
BASOPHILS # BLD AUTO: 0.1 X10E3/UL (ref 0–0.2)
BASOPHILS NFR BLD AUTO: 1 %
BILIRUB SERPL-MCNC: 0.5 MG/DL (ref 0–1.2)
BUN SERPL-MCNC: 26 MG/DL (ref 8–27)
BUN/CREAT SERPL: 17 (ref 10–24)
CALCIUM SERPL-MCNC: 9.9 MG/DL (ref 8.6–10.2)
CHLORIDE SERPL-SCNC: 100 MMOL/L (ref 96–106)
CHOLEST SERPL-MCNC: 143 MG/DL (ref 100–199)
CK SERPL-CCNC: 83 U/L (ref 41–331)
CO2 SERPL-SCNC: 23 MMOL/L (ref 20–29)
CREAT SERPL-MCNC: 1.55 MG/DL (ref 0.76–1.27)
EOSINOPHIL # BLD AUTO: 0.2 X10E3/UL (ref 0–0.4)
EOSINOPHIL NFR BLD AUTO: 2 %
ERYTHROCYTE [DISTWIDTH] IN BLOOD BY AUTOMATED COUNT: 12.9 % (ref 11.6–15.4)
GLOBULIN SER CALC-MCNC: 2.4 G/DL (ref 1.5–4.5)
GLUCOSE SERPL-MCNC: 81 MG/DL (ref 65–99)
HCT VFR BLD AUTO: 39.8 % (ref 37.5–51)
HDLC SERPL-MCNC: 29 MG/DL
HGB BLD-MCNC: 13.7 G/DL (ref 13–17.7)
IMM GRANULOCYTES # BLD AUTO: 0 X10E3/UL (ref 0–0.1)
IMM GRANULOCYTES NFR BLD AUTO: 0 %
LDLC SERPL CALC-MCNC: 77 MG/DL (ref 0–99)
LYMPHOCYTES # BLD AUTO: 1.5 X10E3/UL (ref 0.7–3.1)
LYMPHOCYTES NFR BLD AUTO: 16 %
MCH RBC QN AUTO: 29.6 PG (ref 26.6–33)
MCHC RBC AUTO-ENTMCNC: 34.4 G/DL (ref 31.5–35.7)
MCV RBC AUTO: 86 FL (ref 79–97)
MONOCYTES # BLD AUTO: 0.9 X10E3/UL (ref 0.1–0.9)
MONOCYTES NFR BLD AUTO: 9 %
NEUTROPHILS # BLD AUTO: 7 X10E3/UL (ref 1.4–7)
NEUTROPHILS NFR BLD AUTO: 72 %
PLATELET # BLD AUTO: 260 X10E3/UL (ref 150–450)
POTASSIUM SERPL-SCNC: 4.9 MMOL/L (ref 3.5–5.2)
PROT SERPL-MCNC: 7 G/DL (ref 6–8.5)
RBC # BLD AUTO: 4.63 X10E6/UL (ref 4.14–5.8)
SODIUM SERPL-SCNC: 141 MMOL/L (ref 134–144)
TRIGL SERPL-MCNC: 220 MG/DL (ref 0–149)
VLDLC SERPL CALC-MCNC: 37 MG/DL (ref 5–40)
WBC # BLD AUTO: 9.8 X10E3/UL (ref 3.4–10.8)

## 2022-02-09 NOTE — TELEPHONE ENCOUNTER
----- Message from Geo Serrano MD sent at 2/9/2022  1:37 PM EST -----  His creatinine is 1,5 better than 1 year ago  1,8 but steady from 6 months ago, 1,52. No sure if this is the patient with prostate cancer, also ask him of he sees a nephrologist and ask if he takes ibuprofen, motrim  etc

## 2022-02-09 NOTE — TELEPHONE ENCOUNTER
Spoke with Mr. Vann and went over his recent lab work results from yesterday.   Explained that his creatinine was down from 1 year ago from 1.8 to 1.5.  He states he has seen a nephrologist is the past, but does not see one on a regular basis.  He states he was told to not take Ibuprofen and motrin and very rarely uses these.  Pt verbalized understanding and has no questions or concerns at this time.     Also discussed that Dr. Serrano would like to add a medication call Zetia to take along with his crestor to help get his LDL under 70.  Pt verbalized understanding.

## 2022-02-18 DIAGNOSIS — I10 ESSENTIAL HYPERTENSION: ICD-10-CM

## 2022-02-18 RX ORDER — BISOPROLOL FUMARATE AND HYDROCHLOROTHIAZIDE 5; 6.25 MG/1; MG/1
TABLET ORAL
Qty: 30 TABLET | Refills: 0 | Status: SHIPPED | OUTPATIENT
Start: 2022-02-18 | End: 2022-03-24

## 2022-02-18 RX ORDER — AMLODIPINE BESYLATE AND BENAZEPRIL HYDROCHLORIDE 5; 20 MG/1; MG/1
CAPSULE ORAL
Qty: 30 CAPSULE | Refills: 0 | Status: SHIPPED | OUTPATIENT
Start: 2022-02-18 | End: 2022-03-24

## 2022-03-04 DIAGNOSIS — E78.5 HYPERLIPIDEMIA LDL GOAL <100: ICD-10-CM

## 2022-03-04 RX ORDER — ROSUVASTATIN CALCIUM 40 MG/1
40 TABLET, COATED ORAL DAILY
Qty: 90 TABLET | Refills: 1 | Status: SHIPPED | OUTPATIENT
Start: 2022-03-04 | End: 2022-04-13 | Stop reason: DRUGHIGH

## 2022-03-04 RX ORDER — ROSUVASTATIN CALCIUM 20 MG/1
TABLET, COATED ORAL
Qty: 90 TABLET | Refills: 3 | OUTPATIENT
Start: 2022-03-04

## 2022-03-24 DIAGNOSIS — I10 ESSENTIAL HYPERTENSION: ICD-10-CM

## 2022-03-24 RX ORDER — AMLODIPINE BESYLATE AND BENAZEPRIL HYDROCHLORIDE 5; 20 MG/1; MG/1
CAPSULE ORAL
Qty: 90 CAPSULE | Refills: 1 | Status: SHIPPED | OUTPATIENT
Start: 2022-03-24 | End: 2022-08-05 | Stop reason: ALTCHOICE

## 2022-03-24 RX ORDER — BISOPROLOL FUMARATE 5 MG/1
5 TABLET, FILM COATED ORAL DAILY
Qty: 90 TABLET | Refills: 1 | Status: SHIPPED | OUTPATIENT
Start: 2022-03-24 | End: 2022-05-12 | Stop reason: SDUPTHER

## 2022-03-24 RX ORDER — BISOPROLOL FUMARATE AND HYDROCHLOROTHIAZIDE 5; 6.25 MG/1; MG/1
TABLET ORAL
Qty: 30 TABLET | Refills: 0 | Status: SHIPPED | OUTPATIENT
Start: 2022-03-24 | End: 2022-05-12 | Stop reason: SDUPTHER

## 2022-04-13 ENCOUNTER — TELEPHONE (OUTPATIENT)
Dept: CARDIOLOGY | Facility: CLINIC | Age: 67
End: 2022-04-13

## 2022-04-13 RX ORDER — ROSUVASTATIN CALCIUM 20 MG/1
20 TABLET, COATED ORAL DAILY
Qty: 90 TABLET | Refills: 3 | Status: SHIPPED | OUTPATIENT
Start: 2022-04-13 | End: 2023-02-08 | Stop reason: SDUPTHER

## 2022-04-13 RX ORDER — EZETIMIBE 10 MG/1
10 TABLET ORAL DAILY
COMMUNITY
End: 2022-04-13 | Stop reason: SDUPTHER

## 2022-04-13 RX ORDER — ROSUVASTATIN CALCIUM 20 MG/1
20 TABLET, COATED ORAL DAILY
COMMUNITY
End: 2022-04-13 | Stop reason: SDUPTHER

## 2022-04-13 RX ORDER — EZETIMIBE 10 MG/1
10 TABLET ORAL DAILY
Qty: 90 TABLET | Refills: 3 | Status: SHIPPED | OUTPATIENT
Start: 2022-04-13 | End: 2023-02-08 | Stop reason: SDUPTHER

## 2022-04-13 NOTE — TELEPHONE ENCOUNTER
Mr. Vann called and states you increased his crestor a while back from 20mg to 40mg and he is now experiencing aching all over and headaches.  He states he did not have problems on the 20mg. Please advise. Thank you

## 2022-04-13 NOTE — TELEPHONE ENCOUNTER
Advised Mr. Vann to take Crestor back to 20mg dailly and we will add a medication called Zetia that you will take along with your crestor to lower your LDL. Pt verbalized understanding.

## 2022-04-22 DIAGNOSIS — I10 ESSENTIAL HYPERTENSION: ICD-10-CM

## 2022-04-22 RX ORDER — BISOPROLOL FUMARATE AND HYDROCHLOROTHIAZIDE 5; 6.25 MG/1; MG/1
TABLET ORAL
Qty: 30 TABLET | Refills: 0 | OUTPATIENT
Start: 2022-04-22

## 2022-05-09 DIAGNOSIS — I70.8 LEFT SUBCLAVIAN ARTERY OCCLUSION: ICD-10-CM

## 2022-05-09 RX ORDER — SPIRONOLACTONE AND HYDROCHLOROTHIAZIDE 25; 25 MG/1; MG/1
1 TABLET ORAL DAILY
Qty: 30 TABLET | Refills: 0 | Status: SHIPPED | OUTPATIENT
Start: 2022-05-09 | End: 2022-05-12 | Stop reason: SDUPTHER

## 2022-05-12 ENCOUNTER — TELEPHONE (OUTPATIENT)
Dept: CARDIOLOGY | Facility: CLINIC | Age: 67
End: 2022-05-12

## 2022-05-12 DIAGNOSIS — I70.8 LEFT SUBCLAVIAN ARTERY OCCLUSION: ICD-10-CM

## 2022-05-12 DIAGNOSIS — I10 ESSENTIAL HYPERTENSION: Primary | ICD-10-CM

## 2022-05-12 RX ORDER — BISOPROLOL FUMARATE 5 MG/1
5 TABLET, FILM COATED ORAL DAILY
Qty: 90 TABLET | Refills: 1 | Status: SHIPPED | OUTPATIENT
Start: 2022-05-12 | End: 2023-02-08 | Stop reason: ALTCHOICE

## 2022-05-12 RX ORDER — SPIRONOLACTONE AND HYDROCHLOROTHIAZIDE 25; 25 MG/1; MG/1
1 TABLET ORAL DAILY
Qty: 30 TABLET | Refills: 4 | Status: SHIPPED | OUTPATIENT
Start: 2022-05-12 | End: 2022-09-02

## 2022-05-12 RX ORDER — SPIRONOLACTONE AND HYDROCHLOROTHIAZIDE 25; 25 MG/1; MG/1
1 TABLET ORAL DAILY
Qty: 30 TABLET | Refills: 0 | Status: SHIPPED | OUTPATIENT
Start: 2022-05-12 | End: 2022-05-12 | Stop reason: SDUPTHER

## 2022-05-12 NOTE — TELEPHONE ENCOUNTER
Patient called. Bisoprolol 5mg and also Bisoprolol HCTZ is on his med list. He said Dr. Serrano stopped the B/ HCTZ, kept him on Bisoprolol 5 mg, and he's on Spironalactone/HCTZ. Also, the Spironalactone/HCTZ needs to reflect refills

## 2022-06-10 DIAGNOSIS — I10 ESSENTIAL HYPERTENSION: ICD-10-CM

## 2022-06-10 DIAGNOSIS — I70.8 LEFT SUBCLAVIAN ARTERY OCCLUSION: ICD-10-CM

## 2022-06-10 RX ORDER — SPIRONOLACTONE AND HYDROCHLOROTHIAZIDE 25; 25 MG/1; MG/1
1 TABLET ORAL DAILY
Qty: 30 TABLET | Refills: 4 | OUTPATIENT
Start: 2022-06-10

## 2022-08-05 ENCOUNTER — LAB (OUTPATIENT)
Dept: CARDIOLOGY | Facility: CLINIC | Age: 67
End: 2022-08-05

## 2022-08-05 ENCOUNTER — OFFICE VISIT (OUTPATIENT)
Dept: CARDIOLOGY | Facility: CLINIC | Age: 67
End: 2022-08-05

## 2022-08-05 VITALS
SYSTOLIC BLOOD PRESSURE: 110 MMHG | WEIGHT: 230 LBS | DIASTOLIC BLOOD PRESSURE: 64 MMHG | HEIGHT: 71 IN | HEART RATE: 54 BPM | RESPIRATION RATE: 18 BRPM | OXYGEN SATURATION: 99 % | TEMPERATURE: 98 F | BODY MASS INDEX: 32.2 KG/M2

## 2022-08-05 DIAGNOSIS — E78.5 HYPERLIPIDEMIA LDL GOAL <100: ICD-10-CM

## 2022-08-05 DIAGNOSIS — I70.8 LEFT SUBCLAVIAN ARTERY OCCLUSION: Primary | ICD-10-CM

## 2022-08-05 DIAGNOSIS — I10 ESSENTIAL HYPERTENSION: ICD-10-CM

## 2022-08-05 DIAGNOSIS — I65.23 BILATERAL CAROTID ARTERY STENOSIS: ICD-10-CM

## 2022-08-05 PROCEDURE — 99214 OFFICE O/P EST MOD 30 MIN: CPT | Performed by: INTERNAL MEDICINE

## 2022-08-05 RX ORDER — BENAZEPRIL HYDROCHLORIDE 20 MG/1
20 TABLET ORAL DAILY
Qty: 90 TABLET | Refills: 2 | Status: SHIPPED | OUTPATIENT
Start: 2022-08-05 | End: 2023-02-08 | Stop reason: SDUPTHER

## 2022-08-05 NOTE — PROGRESS NOTES
[facility] [place]  [facility] CARDIOLOGY  [address] [place] [address]  Dept: [phone]  Dept Fax: [phone]    Khadar Vann  1955    Follow Up Office Visit Note    History of Present Illness:  Khadra Vann is a 67 y.o. male who presents to the clinic for Follow-up. Carotid artery disease- He seems doing well, he has right carotid endarterectomy and seems bypass left subclavia to the carotid, he has also left carotid stenosis , on ASA, doing good, last doppler minor disease, next check next year carotid doppler    The following portions of the patient's history were reviewed and updated as appropriate: allergies, current medications, past family history, past medical history, past social history, past surgical history and problem list.    Medications:  amLODIPine-benazepril  aspirin  bisoprolol  ezetimibe  pantoprazole  rosuvastatin  spironolactone-hydrochlorothiazide    Subjective  No Known Allergies     Past Medical History:   Diagnosis Date   • Atherosclerosis of arteries of extremities (HCC)    • Benign essential hypertension    • Bilateral carotid artery stenosis    • Body mass index (BMI) 33.0-33.9, adult    • Diabetes ([facility])    • Dyslipidemia    • GERD (gastroesophageal reflux disease)    • Hyperlipidemia    • Hypertension    • Impaired fasting glucose    • Mixed hypercholesterolemia and hypertriglyceridemia    • Multiple and bilateral precerebral arterial occlusion    • Multiple and bilateral precerebral artery stenosis    • Occlusion and stenosis of bilateral carotid arteries    • Peripheral vascular disease (HCC)    • Pure hypercholesterolemia    • Stroke ([facility]) 2010   • Tobacco abuse        Past Surgical History:   Procedure Laterality Date   • CAROTID ENDARTERECTOMY Right 03/31/2010   • CAROTID ENDARTERECTOMY Left 04/26/2010    with subclavian by-pass   • SHOULDER ARTHROSCOPY Left        Family History   Problem Relation Age of Onset   • Heart disease  "Father    • Hypertension Father    • Diabetes Father    • Cancer Brother         lung   • Diabetes Sister         Social History     Socioeconomic History   • Marital status:    Tobacco Use   • Smoking status: Former Smoker     Quit date: 2010     Years since quittin.6   • Smokeless tobacco: Never Used   Substance and Sexual Activity   • Alcohol use: No   • Drug use: No   • Sexual activity: Defer       Review of Systems   Constitutional: Negative.    HENT: Negative.    Respiratory: Negative.    Cardiovascular: Negative.    Endocrine: Negative.    Genitourinary: Negative.    Musculoskeletal: Negative.    Skin: Negative.    Allergic/Immunologic: Negative.    Neurological: Negative.    Hematological: Negative.    Psychiatric/Behavioral: Negative.        Cardiovascular Procedures    ECHO/MUGA:   STRESS TESTS:   CARDIAC CATH:   DEVICES:   HOLTER:   CT/MRI:   VASCULAR:   CARDIOTHORACIC:     Objective  Vitals:    22 0821   BP: 110/64   BP Location: Left arm   Patient Position: Lying   Cuff Size: Adult   Pulse: 54   Resp: 18   Temp: 98 °F (36.7 °C)   TempSrc: Infrared   SpO2: 99%   Weight: 104 kg (230 lb)   Height: 180.3 cm (71\")   PainSc: 0-No pain     Body mass index is 32.08 kg/m².     Physical Exam  Constitutional:       Appearance: Healthy appearance. Not in distress.   Neck:      Vascular: Carotid bruit present. No JVR. JVD normal.   Pulmonary:      Effort: Pulmonary effort is normal.      Breath sounds: Normal breath sounds. No wheezing. No rhonchi. No rales.   Chest:      Chest wall: Not tender to palpatation.   Cardiovascular:      PMI at left midclavicular line. Normal rate. Regular rhythm. Normal S1. Normal S2.      Murmurs: There is no murmur.      No gallop. No click. No rub.   Pulses:     Intact distal pulses.   Edema:     Peripheral edema absent.   Abdominal:      General: Bowel sounds are normal.      Palpations: Abdomen is soft.      Tenderness: There is no abdominal tenderness. "   Musculoskeletal: Normal range of motion.         General: No tenderness. Skin:     General: Skin is warm and dry.   Neurological:      General: No focal deficit present.      Mental Status: Alert and oriented to person, place and time.          Diagnostic Data  Procedures    Assessment and Plan  Diagnoses and all orders for this visit:    Left subclavian artery occlusion s/p bypass left carotid artery to subclavia   -     CBC & Differential  -     Comprehensive Metabolic Panel  -     Lipid Panel    Essential hypertension- The BP is 110.60, on Bisoprolol 5 mg, Aldactazide 2525, Lotrel 5,20, will d,c Amlodipine, will keep plain Benazepril 20 mg  -     CBC & Differential  -     Comprehensive Metabolic Panel  -     Lipid Panel    Hyperlipidemia LDL goal <100- On Crestor 20 mg and also Zetia 10 mg, will get lab today   -     CBC & Differential  -     Comprehensive Metabolic Panel  -     Lipid Panel    Bilateral carotid artery stenosis- s/p right carotid endarterectomy and he has left carotid bypass to subclavia         No follow-ups on file.    Geo Serrano MD  08/05/2022

## 2022-08-06 LAB
ALBUMIN SERPL-MCNC: 4.5 G/DL (ref 3.8–4.8)
ALBUMIN/GLOB SERPL: 2.1 {RATIO} (ref 1.2–2.2)
ALP SERPL-CCNC: 80 IU/L (ref 44–121)
ALT SERPL-CCNC: 12 IU/L (ref 0–44)
AST SERPL-CCNC: 17 IU/L (ref 0–40)
BASOPHILS # BLD AUTO: 0.1 X10E3/UL (ref 0–0.2)
BASOPHILS NFR BLD AUTO: 1 %
BILIRUB SERPL-MCNC: 0.4 MG/DL (ref 0–1.2)
BUN SERPL-MCNC: 23 MG/DL (ref 8–27)
BUN/CREAT SERPL: 17 (ref 10–24)
CALCIUM SERPL-MCNC: 9.6 MG/DL (ref 8.6–10.2)
CHLORIDE SERPL-SCNC: 103 MMOL/L (ref 96–106)
CHOLEST SERPL-MCNC: 107 MG/DL (ref 100–199)
CO2 SERPL-SCNC: 24 MMOL/L (ref 20–29)
CREAT SERPL-MCNC: 1.35 MG/DL (ref 0.76–1.27)
EGFRCR SERPLBLD CKD-EPI 2021: 58 ML/MIN/1.73
EOSINOPHIL # BLD AUTO: 0.2 X10E3/UL (ref 0–0.4)
EOSINOPHIL NFR BLD AUTO: 2 %
ERYTHROCYTE [DISTWIDTH] IN BLOOD BY AUTOMATED COUNT: 13.3 % (ref 11.6–15.4)
GLOBULIN SER CALC-MCNC: 2.1 G/DL (ref 1.5–4.5)
GLUCOSE SERPL-MCNC: 113 MG/DL (ref 65–99)
HCT VFR BLD AUTO: 41.1 % (ref 37.5–51)
HDLC SERPL-MCNC: 26 MG/DL
HGB BLD-MCNC: 14.1 G/DL (ref 13–17.7)
IMM GRANULOCYTES # BLD AUTO: 0 X10E3/UL (ref 0–0.1)
IMM GRANULOCYTES NFR BLD AUTO: 0 %
LDLC SERPL CALC-MCNC: 55 MG/DL (ref 0–99)
LYMPHOCYTES # BLD AUTO: 1.4 X10E3/UL (ref 0.7–3.1)
LYMPHOCYTES NFR BLD AUTO: 16 %
MCH RBC QN AUTO: 29.4 PG (ref 26.6–33)
MCHC RBC AUTO-ENTMCNC: 34.3 G/DL (ref 31.5–35.7)
MCV RBC AUTO: 86 FL (ref 79–97)
MONOCYTES # BLD AUTO: 0.7 X10E3/UL (ref 0.1–0.9)
MONOCYTES NFR BLD AUTO: 8 %
NEUTROPHILS # BLD AUTO: 6.4 X10E3/UL (ref 1.4–7)
NEUTROPHILS NFR BLD AUTO: 73 %
PLATELET # BLD AUTO: 242 X10E3/UL (ref 150–450)
POTASSIUM SERPL-SCNC: 4.4 MMOL/L (ref 3.5–5.2)
PROT SERPL-MCNC: 6.6 G/DL (ref 6–8.5)
RBC # BLD AUTO: 4.79 X10E6/UL (ref 4.14–5.8)
SODIUM SERPL-SCNC: 141 MMOL/L (ref 134–144)
TRIGL SERPL-MCNC: 149 MG/DL (ref 0–149)
VLDLC SERPL CALC-MCNC: 26 MG/DL (ref 5–40)
WBC # BLD AUTO: 8.8 X10E3/UL (ref 3.4–10.8)

## 2022-08-08 ENCOUNTER — TELEPHONE (OUTPATIENT)
Dept: CARDIOLOGY | Facility: CLINIC | Age: 67
End: 2022-08-08

## 2022-08-08 NOTE — TELEPHONE ENCOUNTER
Spoke with Mr. Vann and advised him his kidney function was better, possibly from the lower BP and with stopping the Amlodipine may even get better. All your other labs were WNL. Pt verbalized understanding.

## 2022-08-08 NOTE — TELEPHONE ENCOUNTER
----- Message from Geo Serrano MD sent at 8/6/2022  9:01 AM EDT -----  Your kidney function is better 1,3 likely due to the low Blood pressure, hopfully stopping the Amlodipine, might help you even more

## 2022-09-02 DIAGNOSIS — I70.8 LEFT SUBCLAVIAN ARTERY OCCLUSION: ICD-10-CM

## 2022-09-02 DIAGNOSIS — I10 ESSENTIAL HYPERTENSION: ICD-10-CM

## 2022-09-02 RX ORDER — SPIRONOLACTONE AND HYDROCHLOROTHIAZIDE 25; 25 MG/1; MG/1
1 TABLET ORAL DAILY
Qty: 30 TABLET | Refills: 4 | Status: SHIPPED | OUTPATIENT
Start: 2022-09-02 | End: 2023-01-30

## 2023-01-30 DIAGNOSIS — I70.8 LEFT SUBCLAVIAN ARTERY OCCLUSION: ICD-10-CM

## 2023-01-30 DIAGNOSIS — I10 ESSENTIAL HYPERTENSION: ICD-10-CM

## 2023-01-30 RX ORDER — SPIRONOLACTONE AND HYDROCHLOROTHIAZIDE 25; 25 MG/1; MG/1
1 TABLET ORAL DAILY
Qty: 30 TABLET | Refills: 4 | Status: SHIPPED | OUTPATIENT
Start: 2023-01-30 | End: 2023-02-08 | Stop reason: SDUPTHER

## 2023-02-02 ENCOUNTER — LAB (OUTPATIENT)
Dept: CARDIOLOGY | Facility: CLINIC | Age: 68
End: 2023-02-02
Payer: COMMERCIAL

## 2023-02-02 DIAGNOSIS — I70.8 LEFT SUBCLAVIAN ARTERY OCCLUSION: ICD-10-CM

## 2023-02-02 DIAGNOSIS — I65.23 BILATERAL CAROTID ARTERY STENOSIS: ICD-10-CM

## 2023-02-02 DIAGNOSIS — E78.5 HYPERLIPIDEMIA LDL GOAL <100: ICD-10-CM

## 2023-02-02 DIAGNOSIS — I10 ESSENTIAL HYPERTENSION: Primary | ICD-10-CM

## 2023-02-03 LAB
ALBUMIN SERPL-MCNC: 4.4 G/DL (ref 3.8–4.8)
ALBUMIN/GLOB SERPL: 1.8 {RATIO} (ref 1.2–2.2)
ALP SERPL-CCNC: 85 IU/L (ref 44–121)
ALT SERPL-CCNC: 11 IU/L (ref 0–44)
AST SERPL-CCNC: 18 IU/L (ref 0–40)
BASOPHILS # BLD AUTO: 0.1 X10E3/UL (ref 0–0.2)
BASOPHILS NFR BLD AUTO: 1 %
BILIRUB SERPL-MCNC: 0.5 MG/DL (ref 0–1.2)
BUN SERPL-MCNC: 26 MG/DL (ref 8–27)
BUN/CREAT SERPL: 16 (ref 10–24)
CALCIUM SERPL-MCNC: 9.8 MG/DL (ref 8.6–10.2)
CHLORIDE SERPL-SCNC: 101 MMOL/L (ref 96–106)
CHOLEST SERPL-MCNC: 126 MG/DL (ref 100–199)
CK SERPL-CCNC: 39 U/L (ref 41–331)
CO2 SERPL-SCNC: 24 MMOL/L (ref 20–29)
CREAT SERPL-MCNC: 1.65 MG/DL (ref 0.76–1.27)
EGFRCR SERPLBLD CKD-EPI 2021: 46 ML/MIN/1.73
EOSINOPHIL # BLD AUTO: 0.2 X10E3/UL (ref 0–0.4)
EOSINOPHIL NFR BLD AUTO: 1 %
ERYTHROCYTE [DISTWIDTH] IN BLOOD BY AUTOMATED COUNT: 13.4 % (ref 11.6–15.4)
GLOBULIN SER CALC-MCNC: 2.4 G/DL (ref 1.5–4.5)
GLUCOSE SERPL-MCNC: 104 MG/DL (ref 70–99)
HCT VFR BLD AUTO: 44.5 % (ref 37.5–51)
HDLC SERPL-MCNC: 31 MG/DL
HGB BLD-MCNC: 14.8 G/DL (ref 13–17.7)
IMM GRANULOCYTES # BLD AUTO: 0.1 X10E3/UL (ref 0–0.1)
IMM GRANULOCYTES NFR BLD AUTO: 1 %
LDLC SERPL CALC-MCNC: 68 MG/DL (ref 0–99)
LYMPHOCYTES # BLD AUTO: 1.3 X10E3/UL (ref 0.7–3.1)
LYMPHOCYTES NFR BLD AUTO: 12 %
MCH RBC QN AUTO: 28.7 PG (ref 26.6–33)
MCHC RBC AUTO-ENTMCNC: 33.3 G/DL (ref 31.5–35.7)
MCV RBC AUTO: 86 FL (ref 79–97)
MONOCYTES # BLD AUTO: 0.9 X10E3/UL (ref 0.1–0.9)
MONOCYTES NFR BLD AUTO: 8 %
NEUTROPHILS # BLD AUTO: 8.5 X10E3/UL (ref 1.4–7)
NEUTROPHILS NFR BLD AUTO: 77 %
PLATELET # BLD AUTO: 291 X10E3/UL (ref 150–450)
POTASSIUM SERPL-SCNC: 4.7 MMOL/L (ref 3.5–5.2)
PROT SERPL-MCNC: 6.8 G/DL (ref 6–8.5)
RBC # BLD AUTO: 5.16 X10E6/UL (ref 4.14–5.8)
SODIUM SERPL-SCNC: 141 MMOL/L (ref 134–144)
TRIGL SERPL-MCNC: 155 MG/DL (ref 0–149)
VLDLC SERPL CALC-MCNC: 27 MG/DL (ref 5–40)
WBC # BLD AUTO: 11 X10E3/UL (ref 3.4–10.8)

## 2023-02-08 ENCOUNTER — OFFICE VISIT (OUTPATIENT)
Dept: CARDIOLOGY | Facility: CLINIC | Age: 68
End: 2023-02-08
Payer: COMMERCIAL

## 2023-02-08 VITALS
OXYGEN SATURATION: 99 % | RESPIRATION RATE: 16 BRPM | DIASTOLIC BLOOD PRESSURE: 74 MMHG | HEART RATE: 84 BPM | BODY MASS INDEX: 33.04 KG/M2 | WEIGHT: 236 LBS | TEMPERATURE: 97 F | SYSTOLIC BLOOD PRESSURE: 112 MMHG | HEIGHT: 71 IN

## 2023-02-08 DIAGNOSIS — E78.5 HYPERLIPIDEMIA LDL GOAL <100: ICD-10-CM

## 2023-02-08 DIAGNOSIS — I70.8 LEFT SUBCLAVIAN ARTERY OCCLUSION: ICD-10-CM

## 2023-02-08 DIAGNOSIS — I65.23 BILATERAL CAROTID ARTERY STENOSIS: Primary | ICD-10-CM

## 2023-02-08 DIAGNOSIS — I10 ESSENTIAL HYPERTENSION: ICD-10-CM

## 2023-02-08 PROCEDURE — 93000 ELECTROCARDIOGRAM COMPLETE: CPT | Performed by: INTERNAL MEDICINE

## 2023-02-08 PROCEDURE — 99214 OFFICE O/P EST MOD 30 MIN: CPT | Performed by: INTERNAL MEDICINE

## 2023-02-08 RX ORDER — ROSUVASTATIN CALCIUM 20 MG/1
20 TABLET, COATED ORAL DAILY
Qty: 90 TABLET | Refills: 3 | Status: SHIPPED | OUTPATIENT
Start: 2023-02-08

## 2023-02-08 RX ORDER — SPIRONOLACTONE AND HYDROCHLOROTHIAZIDE 25; 25 MG/1; MG/1
1 TABLET ORAL DAILY
Qty: 30 TABLET | Refills: 4 | Status: SHIPPED | OUTPATIENT
Start: 2023-02-08

## 2023-02-08 RX ORDER — EZETIMIBE 10 MG/1
10 TABLET ORAL DAILY
Qty: 90 TABLET | Refills: 3 | Status: SHIPPED | OUTPATIENT
Start: 2023-02-08

## 2023-02-08 RX ORDER — BIMATOPROST 0.01 %
DROPS OPHTHALMIC (EYE)
COMMUNITY
Start: 2022-12-10

## 2023-02-08 RX ORDER — BENAZEPRIL HYDROCHLORIDE 20 MG/1
20 TABLET ORAL DAILY
Qty: 90 TABLET | Refills: 2 | Status: SHIPPED | OUTPATIENT
Start: 2023-02-08

## 2023-02-08 NOTE — PROGRESS NOTES
[facility] [place]  [facility] CARDIOLOGY  [address] [place] [address]  Dept: [phone]  Dept Fax: [phone]    Khadar Vann  1955    Follow Up Office Visit Note    History of Present Illness:  Khadar Vann is a 67 y.o. male who presents to the clinic for Follow-up. Hypertension- His BP is still on the low side, his BP is 100.60, we did stop Amlodipine last visit, now on bisoprolol 5mg, Aldactizide 25.25 and benazepril 20 mg, will d,c Bisoprolol and  Will see if the kidney function gets even better now 1,5 was 1,8     The following portions of the patient's history were reviewed and updated as appropriate: allergies, current medications, past family history, past medical history, past social history, past surgical history, and problem list.    Medications:  aspirin  benazepril  ezetimibe  Lumigan solution  pantoprazole  rosuvastatin  spironolactone-hydrochlorothiazide    Subjective  No Known Allergies     Past Medical History:   Diagnosis Date   • Atherosclerosis of arteries of extremities (HCC)    • Benign essential hypertension    • Bilateral carotid artery stenosis    • Body mass index (BMI) 33.0-33.9, adult    • Diabetes (HCC)    • Dyslipidemia    • GERD (gastroesophageal reflux disease)    • Hyperlipidemia    • Hypertension    • Impaired fasting glucose    • Mixed hypercholesterolemia and hypertriglyceridemia    • Multiple and bilateral precerebral arterial occlusion    • Multiple and bilateral precerebral artery stenosis    • Occlusion and stenosis of bilateral carotid arteries    • Peripheral vascular disease (HCC)    • Pure hypercholesterolemia    • Stroke ([facility]) 2010   • Tobacco abuse        Past Surgical History:   Procedure Laterality Date   • CAROTID ENDARTERECTOMY Right 03/31/2010   • CAROTID ENDARTERECTOMY Left 04/26/2010    with subclavian by-pass   • SHOULDER ARTHROSCOPY Left        Family History   Problem Relation Age of Onset   • Heart disease  "Father    • Hypertension Father    • Diabetes Father    • Diabetes Sister    • Cancer Brother         lung        Social History     Socioeconomic History   • Marital status:    • Number of children: 2   • Highest education level: 12th grade   Tobacco Use   • Smoking status: Former     Types: Cigarettes     Quit date:      Years since quittin.1   • Smokeless tobacco: Never   Vaping Use   • Vaping Use: Never used   Substance and Sexual Activity   • Alcohol use: No   • Drug use: No   • Sexual activity: Defer       Review of Systems   Constitutional: Negative.    HENT: Negative.    Respiratory: Negative.    Cardiovascular: Negative.    Endocrine: Negative.    Genitourinary: Negative.    Musculoskeletal: Negative.    Skin: Negative.    Allergic/Immunologic: Negative.    Neurological: Negative.    Hematological: Negative.    Psychiatric/Behavioral: Negative.        Cardiovascular Procedures    ECHO/MUGA:  STRESS TESTS:   CARDIAC CATH:   DEVICES:   HOLTER:   CT/MRI:   VASCULAR:   CARDIOTHORACIC:     Objective  Vitals:    23 1403   BP: 112/74   BP Location: Right arm   Patient Position: Lying   Cuff Size: Adult   Pulse: 84   Resp: 16   Temp: 97 °F (36.1 °C)   TempSrc: Infrared   SpO2: 99%   Weight: 107 kg (236 lb)   Height: 180.3 cm (71\")   PainSc: 0-No pain     Body mass index is 32.92 kg/m².     Physical Exam  Constitutional:       Appearance: Healthy appearance. Not in distress.   Neck:      Vascular: No JVR. JVD normal.   Pulmonary:      Effort: Pulmonary effort is normal.      Breath sounds: Normal breath sounds. No wheezing. No rhonchi. No rales.   Chest:      Chest wall: Not tender to palpatation.   Cardiovascular:      PMI at left midclavicular line. Normal rate. Regular rhythm. Normal S1. Normal S2.      Murmurs: There is no murmur.      No gallop. No click. No rub.   Pulses:     Intact distal pulses.   Edema:     Peripheral edema absent.   Abdominal:      General: Bowel sounds are normal. "      Palpations: Abdomen is soft.      Tenderness: There is no abdominal tenderness.   Musculoskeletal: Normal range of motion.         General: No tenderness. Skin:     General: Skin is warm and dry.   Neurological:      General: No focal deficit present.      Mental Status: Alert and oriented to person, place and time.          Diagnostic Data    ECG 12 Lead    Date/Time: 2/8/2023 2:35 PM  Performed by: Geo Serrano MD  Authorized by: Geo Serrano MD   Comparison: compared with previous ECG from 2/7/2022  Rhythm: sinus rhythm and sinus bradycardia  Rate: bradycardic  BPM: 56  QRS axis: normal    Clinical impression: normal ECG            Assessment and Plan  Diagnoses and all orders for this visit:    Bilateral carotid artery stenosis- He has right carotid endarterectomy and also subclavia stenosis treated with bypass left subclavia to left a carotid artery, asymptomatic on ASA    Essential hypertension- BP is 100.60 will d,c Bisoprolol , keep Aldactazide 25.25 and benazepril 20 mg , will see him back in 3 months  -     Cancel: Urinalysis With Microscopic - Urine, Clean Catch; Future  -     Urinalysis With Microscopic - Urine, Clean Catch    Left subclavian artery occlusion- Prior bypass left subclavia to left carotid  artery    Hyperlipidemia LDL goal <100- on Crestor and Zetia, cholesterol is fine    Other orders  -     Lumigan 0.01 % ophthalmic drops; instill 1 drop in both eyes at bedtime         Return in about 3 months (around 5/8/2023) for Recheck with Dr. Serrano.    Geo Serrano MD  02/08/2023

## 2023-02-09 ENCOUNTER — TELEPHONE (OUTPATIENT)
Dept: CARDIOLOGY | Facility: CLINIC | Age: 68
End: 2023-02-09
Payer: COMMERCIAL

## 2023-02-09 LAB
APPEARANCE UR: CLEAR
BACTERIA #/AREA URNS HPF: NORMAL /[HPF]
BILIRUB UR QL STRIP: NEGATIVE
CASTS URNS QL MICRO: NORMAL /LPF
COLOR UR: YELLOW
EPI CELLS #/AREA URNS HPF: NORMAL /HPF (ref 0–10)
GLUCOSE UR QL STRIP: NEGATIVE
HGB UR QL STRIP: NEGATIVE
KETONES UR QL STRIP: NEGATIVE
LEUKOCYTE ESTERASE UR QL STRIP: NEGATIVE
MICRO URNS: NORMAL
MICRO URNS: NORMAL
NITRITE UR QL STRIP: NEGATIVE
PH UR STRIP: 5 [PH] (ref 5–7.5)
PROT UR QL STRIP: NEGATIVE
RBC #/AREA URNS HPF: NORMAL /HPF (ref 0–2)
SP GR UR STRIP: 1.02 (ref 1–1.03)
UROBILINOGEN UR STRIP-MCNC: 1 MG/DL (ref 0.2–1)
WBC #/AREA URNS HPF: NORMAL /HPF (ref 0–5)

## 2023-05-05 ENCOUNTER — CLINICAL SUPPORT (OUTPATIENT)
Dept: CARDIOLOGY | Facility: CLINIC | Age: 68
End: 2023-05-05
Payer: COMMERCIAL

## 2023-05-05 DIAGNOSIS — I65.23 BILATERAL CAROTID ARTERY STENOSIS: Primary | ICD-10-CM

## 2023-05-05 DIAGNOSIS — E78.5 HYPERLIPIDEMIA LDL GOAL <100: ICD-10-CM

## 2023-05-05 DIAGNOSIS — I10 ESSENTIAL HYPERTENSION: ICD-10-CM

## 2023-05-05 NOTE — PROGRESS NOTES
Venipuncture Blood Specimen Collection  Venipuncture performed in clinic by Mery Forman MA with good hemostasis. Patient tolerated the procedure well without complications.   05/05/23   Mery Forman MA

## 2023-05-06 LAB
ALBUMIN SERPL-MCNC: 4.3 G/DL (ref 3.8–4.8)
ALBUMIN/GLOB SERPL: 1.7 {RATIO} (ref 1.2–2.2)
ALP SERPL-CCNC: 80 IU/L (ref 44–121)
ALT SERPL-CCNC: 10 IU/L (ref 0–44)
AST SERPL-CCNC: 16 IU/L (ref 0–40)
BASOPHILS # BLD AUTO: 0.1 X10E3/UL (ref 0–0.2)
BASOPHILS NFR BLD AUTO: 1 %
BILIRUB SERPL-MCNC: 0.4 MG/DL (ref 0–1.2)
BUN SERPL-MCNC: 25 MG/DL (ref 8–27)
BUN/CREAT SERPL: 18 (ref 10–24)
CALCIUM SERPL-MCNC: 9.8 MG/DL (ref 8.6–10.2)
CHLORIDE SERPL-SCNC: 101 MMOL/L (ref 96–106)
CO2 SERPL-SCNC: 23 MMOL/L (ref 20–29)
CREAT SERPL-MCNC: 1.37 MG/DL (ref 0.76–1.27)
EGFRCR SERPLBLD CKD-EPI 2021: 57 ML/MIN/1.73
EOSINOPHIL # BLD AUTO: 0.2 X10E3/UL (ref 0–0.4)
EOSINOPHIL NFR BLD AUTO: 3 %
ERYTHROCYTE [DISTWIDTH] IN BLOOD BY AUTOMATED COUNT: 13.5 % (ref 11.6–15.4)
GLOBULIN SER CALC-MCNC: 2.5 G/DL (ref 1.5–4.5)
GLUCOSE SERPL-MCNC: 119 MG/DL (ref 70–99)
HCT VFR BLD AUTO: 43 % (ref 37.5–51)
HGB BLD-MCNC: 14.4 G/DL (ref 13–17.7)
IMM GRANULOCYTES # BLD AUTO: 0 X10E3/UL (ref 0–0.1)
IMM GRANULOCYTES NFR BLD AUTO: 0 %
LYMPHOCYTES # BLD AUTO: 1.4 X10E3/UL (ref 0.7–3.1)
LYMPHOCYTES NFR BLD AUTO: 19 %
MCH RBC QN AUTO: 29.5 PG (ref 26.6–33)
MCHC RBC AUTO-ENTMCNC: 33.5 G/DL (ref 31.5–35.7)
MCV RBC AUTO: 88 FL (ref 79–97)
MONOCYTES # BLD AUTO: 0.6 X10E3/UL (ref 0.1–0.9)
MONOCYTES NFR BLD AUTO: 8 %
NEUTROPHILS # BLD AUTO: 5.1 X10E3/UL (ref 1.4–7)
NEUTROPHILS NFR BLD AUTO: 69 %
PLATELET # BLD AUTO: 247 X10E3/UL (ref 150–450)
POTASSIUM SERPL-SCNC: 4.4 MMOL/L (ref 3.5–5.2)
PROT SERPL-MCNC: 6.8 G/DL (ref 6–8.5)
RBC # BLD AUTO: 4.88 X10E6/UL (ref 4.14–5.8)
SODIUM SERPL-SCNC: 139 MMOL/L (ref 134–144)
WBC # BLD AUTO: 7.3 X10E3/UL (ref 3.4–10.8)

## 2023-05-12 ENCOUNTER — OFFICE VISIT (OUTPATIENT)
Dept: CARDIOLOGY | Facility: CLINIC | Age: 68
End: 2023-05-12
Payer: COMMERCIAL

## 2023-05-12 VITALS
SYSTOLIC BLOOD PRESSURE: 120 MMHG | HEIGHT: 71 IN | DIASTOLIC BLOOD PRESSURE: 66 MMHG | TEMPERATURE: 98 F | RESPIRATION RATE: 16 BRPM | HEART RATE: 80 BPM | WEIGHT: 230 LBS | OXYGEN SATURATION: 98 % | BODY MASS INDEX: 32.2 KG/M2

## 2023-05-12 DIAGNOSIS — I70.8 LEFT SUBCLAVIAN ARTERY OCCLUSION: ICD-10-CM

## 2023-05-12 DIAGNOSIS — I65.23 BILATERAL CAROTID ARTERY STENOSIS: Primary | ICD-10-CM

## 2023-05-12 DIAGNOSIS — E78.5 HYPERLIPIDEMIA LDL GOAL <100: ICD-10-CM

## 2023-05-12 DIAGNOSIS — I10 ESSENTIAL HYPERTENSION: ICD-10-CM

## 2023-05-12 PROCEDURE — 99214 OFFICE O/P EST MOD 30 MIN: CPT | Performed by: INTERNAL MEDICINE

## 2023-05-12 NOTE — PROGRESS NOTES
[facility] [place]  [facility] CARDIOLOGY  [address] [place] [address]  Dept: [phone]  Dept Fax: [phone]    Khadar Vann  1955    Follow Up Office Visit Note    History of Present Illness:  Khadar Vann is a 68 y.o. male who presents to the clinic for Follow-up. Hypertension- He is asymptomatic, doing good, BP is 120.80 on Aldactazide and also Benazepril 20 mg, we have d,c Amlodipine and bisoprolol and his Is good was low before, his creatinine also has improved after stopping this meds to 1,3 from 1.,8. he feels good he has right carotid endarterectomy in 2010 and also left subclavia stenosis with bypass to left carotid but the bypass was found occluded in 2017, by CTA, he denies any chest pain, SOB, , will get a carotid doppler. He is also on Crestor and Zetia, will get a Lipid profile  And CMP    The following portions of the patient's history were reviewed and updated as appropriate: allergies, current medications, past family history, past medical history, past social history, past surgical history, and problem list.    Medications:  aspirin  benazepril  ezetimibe  Lumigan solution  pantoprazole  rosuvastatin  spironolactone-hydrochlorothiazide    Subjective  No Known Allergies     Past Medical History:   Diagnosis Date   • Atherosclerosis of arteries of extremities    • Benign essential hypertension    • Bilateral carotid artery stenosis    • Body mass index (BMI) 33.0-33.9, adult    • Diabetes    • Dyslipidemia    • GERD (gastroesophageal reflux disease)    • Hyperlipidemia    • Hypertension    • Impaired fasting glucose    • Mixed hypercholesterolemia and hypertriglyceridemia    • Multiple and bilateral precerebral arterial occlusion    • Multiple and bilateral precerebral artery stenosis    • Occlusion and stenosis of bilateral carotid arteries    • Peripheral vascular disease    • Pure hypercholesterolemia    • Stroke 2010   • Tobacco abuse   "      Past Surgical History:   Procedure Laterality Date   • CAROTID ENDARTERECTOMY Right 2010   • CAROTID ENDARTERECTOMY Left 2010    with subclavian by-pass   • SHOULDER ARTHROSCOPY Left        Family History   Problem Relation Age of Onset   • Heart disease Father    • Hypertension Father    • Diabetes Father    • Diabetes Sister    • Cancer Brother         lung        Social History     Socioeconomic History   • Marital status:    • Number of children: 2   • Highest education level: 12th grade   Tobacco Use   • Smoking status: Former     Types: Cigarettes     Quit date:      Years since quittin.3   • Smokeless tobacco: Never   Vaping Use   • Vaping Use: Never used   Substance and Sexual Activity   • Alcohol use: No   • Drug use: No   • Sexual activity: Defer       Review of Systems   Constitutional: Negative.    HENT: Negative.    Respiratory: Negative.    Cardiovascular: Negative.    Endocrine: Negative.    Genitourinary: Negative.    Musculoskeletal: Negative.    Skin: Negative.    Allergic/Immunologic: Negative.    Neurological: Negative.    Hematological: Negative.    Psychiatric/Behavioral: Negative.        Cardiovascular Procedures    ECHO/MUGA:  STRESS TESTS:   CARDIAC CATH:   DEVICES:   HOLTER:   CT/MRI:   VASCULAR:   CARDIOTHORACIC:     Objective  Vitals:    23 0901   BP: 120/66   BP Location: Right arm   Patient Position: Lying   Cuff Size: Adult   Pulse: 80   Resp: 16   Temp: 98 °F (36.7 °C)   TempSrc: Infrared   SpO2: 98%   Weight: 104 kg (230 lb)   Height: 180.3 cm (71\")   PainSc: 0-No pain     Body mass index is 32.08 kg/m².     Physical Exam  Vitals reviewed.   Constitutional:       Appearance: Healthy appearance. Not in distress.   Eyes:      Pupils: Pupils are equal, round, and reactive to light.   HENT:    Mouth/Throat:      Pharynx: Oropharynx is clear.   Neck:      Thyroid: Thyroid normal.      Vascular: No JVR. JVD normal.   Pulmonary:      Effort: " Pulmonary effort is normal.      Breath sounds: Normal breath sounds. No wheezing. No rhonchi. No rales.   Chest:      Chest wall: Not tender to palpatation.   Cardiovascular:      PMI at left midclavicular line. Normal rate. Regular rhythm. Normal S1. Normal S2.      Murmurs: There is no murmur.      No gallop. No click. No rub.   Pulses:     Intact distal pulses.   Edema:     Peripheral edema absent.   Abdominal:      General: Bowel sounds are normal.      Palpations: Abdomen is soft.      Tenderness: There is no abdominal tenderness.   Musculoskeletal: Normal range of motion.         General: No tenderness.      Cervical back: Normal range of motion and neck supple. Skin:     General: Skin is warm and dry.   Neurological:      General: No focal deficit present.      Mental Status: Alert and oriented to person, place and time.          Diagnostic Data  Procedures    Assessment and Plan  Diagnoses and all orders for this visit:    Bilateral carotid artery stenosis- s.p right carotid endarterectomy in 2010, will get a carotid doppler  -     Duplex Carotid Ultrasound CAR; Future    Essential hypertension- BP is 120.60, on Aldactazide and also benazpril    Hyperlipidemia LDL goal <100- On Crestor and also Zetia, will get a Lipid profile    Left subclavian artery occlusion- he is s/PLAN: left bypass to carotid artery, which was found occluded in 2017 by CTA          No follow-ups on file.    Geo Serrano MD  05/12/2023

## 2023-05-30 ENCOUNTER — TELEPHONE (OUTPATIENT)
Dept: CARDIOLOGY | Facility: CLINIC | Age: 68
End: 2023-05-30

## 2023-05-30 NOTE — TELEPHONE ENCOUNTER
----- Message from Geo Serrano MD sent at 5/28/2023  1:30 PM EDT -----  Moderate plaques on the carotid arteries less than 40%

## 2023-06-06 DIAGNOSIS — I70.8 LEFT SUBCLAVIAN ARTERY OCCLUSION: ICD-10-CM

## 2023-06-06 DIAGNOSIS — I10 ESSENTIAL HYPERTENSION: ICD-10-CM

## 2023-06-06 RX ORDER — BISOPROLOL FUMARATE 5 MG/1
5 TABLET, FILM COATED ORAL DAILY
Qty: 90 TABLET | Refills: 1 | OUTPATIENT
Start: 2023-06-06

## 2023-10-23 RX ORDER — BENAZEPRIL HYDROCHLORIDE 20 MG/1
20 TABLET ORAL DAILY
Qty: 90 TABLET | Refills: 2 | Status: SHIPPED | OUTPATIENT
Start: 2023-10-23

## 2023-11-17 ENCOUNTER — CLINICAL SUPPORT (OUTPATIENT)
Dept: CARDIOLOGY | Facility: CLINIC | Age: 68
End: 2023-11-17
Payer: COMMERCIAL

## 2023-11-17 DIAGNOSIS — I70.8 LEFT SUBCLAVIAN ARTERY OCCLUSION: Primary | ICD-10-CM

## 2023-11-17 DIAGNOSIS — I65.23 BILATERAL CAROTID ARTERY STENOSIS: ICD-10-CM

## 2023-11-17 DIAGNOSIS — E78.5 HYPERLIPIDEMIA LDL GOAL <100: ICD-10-CM

## 2023-11-17 DIAGNOSIS — I10 ESSENTIAL HYPERTENSION: ICD-10-CM

## 2023-11-17 PROCEDURE — 36415 COLL VENOUS BLD VENIPUNCTURE: CPT | Performed by: INTERNAL MEDICINE

## 2023-11-17 NOTE — PROGRESS NOTES
Venipuncture Blood Specimen Collection  Venipuncture performed in clinic by Yessica Mosley MA with good hemostasis. Patient tolerated the procedure well without complications.   11/17/23   Yessica Mosley MA

## 2023-11-18 LAB
ALBUMIN SERPL-MCNC: 4.3 G/DL (ref 3.9–4.9)
ALBUMIN/GLOB SERPL: 1.7 {RATIO} (ref 1.2–2.2)
ALP SERPL-CCNC: 88 IU/L (ref 44–121)
ALT SERPL-CCNC: 10 IU/L (ref 0–44)
AST SERPL-CCNC: 15 IU/L (ref 0–40)
BASOPHILS # BLD AUTO: 0.1 X10E3/UL (ref 0–0.2)
BASOPHILS NFR BLD AUTO: 1 %
BILIRUB SERPL-MCNC: 0.4 MG/DL (ref 0–1.2)
BUN SERPL-MCNC: 19 MG/DL (ref 8–27)
BUN/CREAT SERPL: 13 (ref 10–24)
CALCIUM SERPL-MCNC: 9.8 MG/DL (ref 8.6–10.2)
CHLORIDE SERPL-SCNC: 101 MMOL/L (ref 96–106)
CHOLEST SERPL-MCNC: 126 MG/DL (ref 100–199)
CK SERPL-CCNC: 48 U/L (ref 41–331)
CO2 SERPL-SCNC: 23 MMOL/L (ref 20–29)
CREAT SERPL-MCNC: 1.44 MG/DL (ref 0.76–1.27)
EGFRCR SERPLBLD CKD-EPI 2021: 53 ML/MIN/1.73
EOSINOPHIL # BLD AUTO: 0.2 X10E3/UL (ref 0–0.4)
EOSINOPHIL NFR BLD AUTO: 2 %
ERYTHROCYTE [DISTWIDTH] IN BLOOD BY AUTOMATED COUNT: 12.8 % (ref 11.6–15.4)
GLOBULIN SER CALC-MCNC: 2.5 G/DL (ref 1.5–4.5)
GLUCOSE SERPL-MCNC: 118 MG/DL (ref 70–99)
HCT VFR BLD AUTO: 41.1 % (ref 37.5–51)
HDLC SERPL-MCNC: 34 MG/DL
HGB BLD-MCNC: 13.9 G/DL (ref 13–17.7)
IMM GRANULOCYTES # BLD AUTO: 0 X10E3/UL (ref 0–0.1)
IMM GRANULOCYTES NFR BLD AUTO: 0 %
LDLC SERPL CALC-MCNC: 69 MG/DL (ref 0–99)
LYMPHOCYTES # BLD AUTO: 1.2 X10E3/UL (ref 0.7–3.1)
LYMPHOCYTES NFR BLD AUTO: 11 %
MCH RBC QN AUTO: 29.3 PG (ref 26.6–33)
MCHC RBC AUTO-ENTMCNC: 33.8 G/DL (ref 31.5–35.7)
MCV RBC AUTO: 87 FL (ref 79–97)
MONOCYTES # BLD AUTO: 1.1 X10E3/UL (ref 0.1–0.9)
MONOCYTES NFR BLD AUTO: 9 %
NEUTROPHILS # BLD AUTO: 9 X10E3/UL (ref 1.4–7)
NEUTROPHILS NFR BLD AUTO: 77 %
PLATELET # BLD AUTO: 273 X10E3/UL (ref 150–450)
POTASSIUM SERPL-SCNC: 4.1 MMOL/L (ref 3.5–5.2)
PROT SERPL-MCNC: 6.8 G/DL (ref 6–8.5)
RBC # BLD AUTO: 4.75 X10E6/UL (ref 4.14–5.8)
SODIUM SERPL-SCNC: 141 MMOL/L (ref 134–144)
TRIGL SERPL-MCNC: 130 MG/DL (ref 0–149)
VLDLC SERPL CALC-MCNC: 23 MG/DL (ref 5–40)
WBC # BLD AUTO: 11.7 X10E3/UL (ref 3.4–10.8)

## 2023-12-01 ENCOUNTER — OFFICE VISIT (OUTPATIENT)
Dept: CARDIOLOGY | Facility: CLINIC | Age: 68
End: 2023-12-01
Payer: COMMERCIAL

## 2023-12-01 VITALS
SYSTOLIC BLOOD PRESSURE: 128 MMHG | RESPIRATION RATE: 17 BRPM | BODY MASS INDEX: 31.64 KG/M2 | DIASTOLIC BLOOD PRESSURE: 80 MMHG | HEIGHT: 71 IN | OXYGEN SATURATION: 96 % | HEART RATE: 96 BPM | WEIGHT: 226 LBS

## 2023-12-01 DIAGNOSIS — I10 ESSENTIAL HYPERTENSION: Primary | ICD-10-CM

## 2023-12-01 DIAGNOSIS — E78.5 HYPERLIPIDEMIA LDL GOAL <100: ICD-10-CM

## 2023-12-01 DIAGNOSIS — I73.9 PVD (PERIPHERAL VASCULAR DISEASE) WITH CLAUDICATION: ICD-10-CM

## 2023-12-01 DIAGNOSIS — I65.23 BILATERAL CAROTID ARTERY STENOSIS: ICD-10-CM

## 2023-12-01 DIAGNOSIS — R73.03 PREDIABETES: ICD-10-CM

## 2023-12-01 DIAGNOSIS — I70.8 LEFT SUBCLAVIAN ARTERY OCCLUSION: ICD-10-CM

## 2023-12-01 PROCEDURE — 99214 OFFICE O/P EST MOD 30 MIN: CPT | Performed by: INTERNAL MEDICINE

## 2023-12-01 RX ORDER — BENAZEPRIL HYDROCHLORIDE 20 MG/1
20 TABLET ORAL DAILY
Qty: 90 TABLET | Refills: 3 | Status: SHIPPED | OUTPATIENT
Start: 2023-12-01

## 2023-12-01 RX ORDER — SPIRONOLACTONE AND HYDROCHLOROTHIAZIDE 25; 25 MG/1; MG/1
1 TABLET ORAL DAILY
Qty: 90 TABLET | Refills: 3 | Status: SHIPPED | OUTPATIENT
Start: 2023-12-01

## 2023-12-01 NOTE — PROGRESS NOTES
[facility] [place]  [facility] CARDIOLOGY  [address] [place] [address]  Dept: [phone]  Dept Fax: [phone]    Khadar Vann  1955    Follow Up Office Visit Note    History of Present Illness:  Khadar Vann is a 68 y.o. male who presents to the clinic for Follow-up.Hypertension- the BP seems fine on Aldactone Hctz 25.25 and also Benazpril 20 mg daily, no complaint, has lost some weight BP is 128.80. his creatinine is steady 1.4     The following portions of the patient's history were reviewed and updated as appropriate: allergies, current medications, past family history, past medical history, past social history, past surgical history, and problem list.    Medications:  aspirin  benazepril  ezetimibe  Lumigan solution  rosuvastatin  spironolactone-hydrochlorothiazide    Subjective  No Known Allergies     Past Medical History:   Diagnosis Date    Atherosclerosis of arteries of extremities     Benign essential hypertension     Bilateral carotid artery stenosis     Body mass index (BMI) 33.0-33.9, adult     Diabetes     Dyslipidemia     GERD (gastroesophageal reflux disease)     Hyperlipidemia     Hypertension     Impaired fasting glucose     Mixed hypercholesterolemia and hypertriglyceridemia     Multiple and bilateral precerebral arterial occlusion     Multiple and bilateral precerebral artery stenosis     Occlusion and stenosis of bilateral carotid arteries     Peripheral vascular disease     Pure hypercholesterolemia     Stroke 2010    Tobacco abuse        Past Surgical History:   Procedure Laterality Date    CAROTID ENDARTERECTOMY Right 03/31/2010    CAROTID ENDARTERECTOMY Left 04/26/2010    with subclavian by-pass    SHOULDER ARTHROSCOPY Left        Family History   Problem Relation Age of Onset    Heart disease Father     Hypertension Father     Diabetes Father     Diabetes Sister     Cancer Brother         lung        Social History     Socioeconomic History  Patient Transferred to: 10s  Handoff Report Given to: EMILY Horner "   Marital status:     Number of children: 2    Highest education level: 12th grade   Tobacco Use    Smoking status: Former     Types: Cigarettes     Quit date: 2010     Years since quittin.9    Smokeless tobacco: Never   Vaping Use    Vaping Use: Never used   Substance and Sexual Activity    Alcohol use: No    Drug use: No    Sexual activity: Defer       Review of Systems   Constitutional: Negative.    HENT: Negative.     Respiratory: Negative.     Cardiovascular: Negative.    Endocrine: Negative.    Genitourinary: Negative.    Musculoskeletal: Negative.    Skin: Negative.    Allergic/Immunologic: Negative.    Neurological: Negative.    Hematological: Negative.    Psychiatric/Behavioral: Negative.       Cardiovascular Procedures    ECHO/MUGA:  STRESS TESTS:   CARDIAC CATH:   DEVICES:   HOLTER:   CT/MRI:   VASCULAR:   CARDIOTHORACIC:     Objective  Vitals:    23 1456 23 1533   BP: 159/84 128/80   BP Location: Right arm    Patient Position: Sitting    Cuff Size: Adult    Pulse: 96    Resp: 17    SpO2: 96%    Weight: 103 kg (226 lb)    Height: 180.3 cm (71\")      Body mass index is 31.52 kg/m².     Physical Exam  Constitutional:       Appearance: Healthy appearance. Not in distress.   Neck:      Vascular: Carotid bruit present. No JVR. JVD normal.   Pulmonary:      Effort: Pulmonary effort is normal.      Breath sounds: Normal breath sounds. No wheezing. No rhonchi. No rales.   Chest:      Chest wall: Not tender to palpatation.   Cardiovascular:      PMI at left midclavicular line. Normal rate. Regular rhythm. Normal S1. Normal S2.       Murmurs: There is no murmur.      No gallop.  No click. No rub.   Pulses:     Intact distal pulses.   Edema:     Peripheral edema absent.   Abdominal:      General: Bowel sounds are normal.      Palpations: Abdomen is soft.      Tenderness: There is no abdominal tenderness.   Musculoskeletal: Normal range of motion.         General: No tenderness. Skin:     " General: Skin is warm and dry.   Neurological:      General: No focal deficit present.      Mental Status: Alert and oriented to person, place and time.        Diagnostic Data  Procedures    Assessment and Plan  Diagnoses and all orders for this visit:    Essential hypertension- BP is fine, On Aldactone 25.25 and Lotensin 20 mg   -     spironolactone-hydrochlorothiazide (ALDACTAZIDE) 25-25 MG tablet; Take 1 tablet by mouth Daily.    Hyperlipidemia LDL goal <100- On Crestor 20 mg and also Zetia, Lipids are good, he tolerates well     Left subclavian artery occlusions.p bypass to left carotid   -     spironolactone-hydrochlorothiazide (ALDACTAZIDE) 25-25 MG tablet; Take 1 tablet by mouth Daily.  -     Doppler Arterial Multi Level Lower Extremity - Bilateral CAR; Future    Bilateral carotid artery stenosis- he has bilateral carotid endarterectomy  and last carotid disease mild disease on Asa 81 mg  -     Doppler Arterial Multi Level Lower Extremity - Bilateral CAR; Future    PVD (peripheral vascular disease) with claudication- he has some claudications walking 1 block, his pulses are mildly decreased will get an EMMA  -     Doppler Arterial Multi Level Lower Extremity - Bilateral CAR; Future    Prediabetes will check A!C  -     Hemoglobin A1c  -     Doppler Arterial Multi Level Lower Extremity - Bilateral CAR; Future    Other orders  -     benazepril (LOTENSIN) 20 MG tablet; Take 1 tablet by mouth Daily.         Return in about 6 months (around 6/1/2024) for Recheck with Dr. Serrano.    Geo Serrano MD  12/01/2023

## 2023-12-02 LAB — HBA1C MFR BLD: 5.9 % (ref 4.8–5.6)

## 2023-12-04 ENCOUNTER — TELEPHONE (OUTPATIENT)
Dept: CARDIOLOGY | Facility: CLINIC | Age: 68
End: 2023-12-04
Payer: COMMERCIAL

## 2023-12-04 NOTE — TELEPHONE ENCOUNTER
----- Message from Kailey Martel RN sent at 12/4/2023  3:29 PM EST -----    ----- Message -----  From: Geo Serrano MD  Sent: 12/2/2023  11:47 AM EST  To: Kailey Martel RN    Prediabetes 5,9 please keep losing weight exercise, no donghts please

## 2024-01-02 ENCOUNTER — TRANSCRIBE ORDERS (OUTPATIENT)
Dept: CARDIOLOGY | Facility: CLINIC | Age: 69
End: 2024-01-02
Payer: COMMERCIAL

## 2024-01-02 ENCOUNTER — TELEPHONE (OUTPATIENT)
Dept: CARDIOLOGY | Facility: CLINIC | Age: 69
End: 2024-01-02
Payer: COMMERCIAL

## 2024-01-02 DIAGNOSIS — I73.9 PERIPHERAL VASCULAR DISEASE, UNSPECIFIED: Primary | ICD-10-CM

## 2024-01-19 ENCOUNTER — TELEPHONE (OUTPATIENT)
Dept: CARDIOLOGY | Facility: CLINIC | Age: 69
End: 2024-01-19
Payer: COMMERCIAL

## 2024-01-19 NOTE — TELEPHONE ENCOUNTER
"Spoke with Edwar Keith and went over the EMMA results.    The test indicates moderate blockages and Dr Serrano said you can add baby Xarelto 2.5mg BID to take along with the aspirin you currently take.  He advised he has never had any bleeding issues.  \"My legs actually do not hurt very bad like they use to and I think for now I will just continue the aspirin\".  I advised if pain changes he can always call us back and we can get the Xarelto started. I will let Dr. Serrano know.   "

## 2024-01-19 NOTE — TELEPHONE ENCOUNTER
----- Message from Geo Serrano MD sent at 1/18/2024  2:35 PM EST -----  The EMMA indicates at least moderate blockages, we can add baby Xarelto 2,5 bid, and keep also ASA, please ask if he ever has had major bleeding, if the claudication get worse despite the meds, he will let us know,

## 2024-02-23 RX ORDER — EZETIMIBE 10 MG/1
10 TABLET ORAL DAILY
Qty: 90 TABLET | Refills: 3 | Status: SHIPPED | OUTPATIENT
Start: 2024-02-23

## 2024-02-23 RX ORDER — ROSUVASTATIN CALCIUM 20 MG/1
20 TABLET, COATED ORAL DAILY
Qty: 90 TABLET | Refills: 3 | Status: SHIPPED | OUTPATIENT
Start: 2024-02-23

## 2024-05-09 ENCOUNTER — CLINICAL SUPPORT (OUTPATIENT)
Dept: CARDIOLOGY | Facility: CLINIC | Age: 69
End: 2024-05-09
Payer: COMMERCIAL

## 2024-05-09 DIAGNOSIS — R73.03 PREDIABETES: ICD-10-CM

## 2024-05-09 DIAGNOSIS — E78.5 HYPERLIPIDEMIA LDL GOAL <100: ICD-10-CM

## 2024-05-09 DIAGNOSIS — I70.8 LEFT SUBCLAVIAN ARTERY OCCLUSION: Primary | ICD-10-CM

## 2024-05-09 DIAGNOSIS — I10 ESSENTIAL HYPERTENSION: ICD-10-CM

## 2024-05-09 DIAGNOSIS — I65.23 BILATERAL CAROTID ARTERY STENOSIS: ICD-10-CM

## 2024-05-09 PROCEDURE — 36415 COLL VENOUS BLD VENIPUNCTURE: CPT | Performed by: INTERNAL MEDICINE

## 2024-05-11 LAB
ALBUMIN SERPL-MCNC: 4.6 G/DL (ref 3.9–4.9)
ALBUMIN/GLOB SERPL: 2 {RATIO} (ref 1.2–2.2)
ALP SERPL-CCNC: 84 IU/L (ref 44–121)
ALT SERPL-CCNC: 12 IU/L (ref 0–44)
APO B SERPL-MCNC: 78 MG/DL
AST SERPL-CCNC: 17 IU/L (ref 0–40)
BASOPHILS # BLD AUTO: 0.1 X10E3/UL (ref 0–0.2)
BASOPHILS NFR BLD AUTO: 1 %
BILIRUB SERPL-MCNC: 0.4 MG/DL (ref 0–1.2)
BUN SERPL-MCNC: 41 MG/DL (ref 8–27)
BUN/CREAT SERPL: 20 (ref 10–24)
CALCIUM SERPL-MCNC: 10.2 MG/DL (ref 8.6–10.2)
CHLORIDE SERPL-SCNC: 103 MMOL/L (ref 96–106)
CHOLEST SERPL-MCNC: 107 MG/DL (ref 100–199)
CK SERPL-CCNC: 44 U/L (ref 41–331)
CO2 SERPL-SCNC: 23 MMOL/L (ref 20–29)
CREAT SERPL-MCNC: 2.01 MG/DL (ref 0.76–1.27)
EGFRCR SERPLBLD CKD-EPI 2021: 35 ML/MIN/1.73
EOSINOPHIL # BLD AUTO: 0.2 X10E3/UL (ref 0–0.4)
EOSINOPHIL NFR BLD AUTO: 3 %
ERYTHROCYTE [DISTWIDTH] IN BLOOD BY AUTOMATED COUNT: 14.5 % (ref 11.6–15.4)
GLOBULIN SER CALC-MCNC: 2.3 G/DL (ref 1.5–4.5)
GLUCOSE SERPL-MCNC: 125 MG/DL (ref 70–99)
HBA1C MFR BLD: 6.5 % (ref 4.8–5.6)
HCT VFR BLD AUTO: 41.1 % (ref 37.5–51)
HDLC SERPL-MCNC: 31 MG/DL
HGB BLD-MCNC: 13.4 G/DL (ref 13–17.7)
IMM GRANULOCYTES # BLD AUTO: 0 X10E3/UL (ref 0–0.1)
IMM GRANULOCYTES NFR BLD AUTO: 0 %
LDLC SERPL CALC-MCNC: 56 MG/DL (ref 0–99)
LPA SERPL-SCNC: 426.9 NMOL/L
LYMPHOCYTES # BLD AUTO: 1.2 X10E3/UL (ref 0.7–3.1)
LYMPHOCYTES NFR BLD AUTO: 16 %
MCH RBC QN AUTO: 28.3 PG (ref 26.6–33)
MCHC RBC AUTO-ENTMCNC: 32.6 G/DL (ref 31.5–35.7)
MCV RBC AUTO: 87 FL (ref 79–97)
MONOCYTES # BLD AUTO: 0.7 X10E3/UL (ref 0.1–0.9)
MONOCYTES NFR BLD AUTO: 9 %
NEUTROPHILS # BLD AUTO: 5.4 X10E3/UL (ref 1.4–7)
NEUTROPHILS NFR BLD AUTO: 71 %
PLATELET # BLD AUTO: 278 X10E3/UL (ref 150–450)
POTASSIUM SERPL-SCNC: 4.8 MMOL/L (ref 3.5–5.2)
PROT SERPL-MCNC: 6.9 G/DL (ref 6–8.5)
RBC # BLD AUTO: 4.74 X10E6/UL (ref 4.14–5.8)
SODIUM SERPL-SCNC: 139 MMOL/L (ref 134–144)
TRIGL SERPL-MCNC: 108 MG/DL (ref 0–149)
VLDLC SERPL CALC-MCNC: 20 MG/DL (ref 5–40)
WBC # BLD AUTO: 7.6 X10E3/UL (ref 3.4–10.8)

## 2024-05-28 ENCOUNTER — CLINICAL SUPPORT (OUTPATIENT)
Dept: CARDIOLOGY | Facility: CLINIC | Age: 69
End: 2024-05-28
Payer: COMMERCIAL

## 2024-05-28 DIAGNOSIS — I10 ESSENTIAL HYPERTENSION: Primary | ICD-10-CM

## 2024-05-28 PROCEDURE — 36415 COLL VENOUS BLD VENIPUNCTURE: CPT | Performed by: INTERNAL MEDICINE

## 2024-05-28 NOTE — PROGRESS NOTES
Venipuncture Blood Specimen Collection  Venipuncture performed in clinic by Yessica Mosley MA with good hemostasis. Patient tolerated the procedure well without complications.   05/28/24   Yessica Mosley MA

## 2024-05-29 LAB
ALBUMIN SERPL-MCNC: 4.2 G/DL (ref 3.9–4.9)
ALBUMIN/GLOB SERPL: 1.9 {RATIO} (ref 1.2–2.2)
ALP SERPL-CCNC: 77 IU/L (ref 44–121)
ALT SERPL-CCNC: 7 IU/L (ref 0–44)
AST SERPL-CCNC: 15 IU/L (ref 0–40)
BILIRUB SERPL-MCNC: 0.4 MG/DL (ref 0–1.2)
BUN SERPL-MCNC: 20 MG/DL (ref 8–27)
BUN/CREAT SERPL: 14 (ref 10–24)
CALCIUM SERPL-MCNC: 9.6 MG/DL (ref 8.6–10.2)
CHLORIDE SERPL-SCNC: 105 MMOL/L (ref 96–106)
CO2 SERPL-SCNC: 23 MMOL/L (ref 20–29)
CREAT SERPL-MCNC: 1.38 MG/DL (ref 0.76–1.27)
EGFRCR SERPLBLD CKD-EPI 2021: 56 ML/MIN/1.73
GLOBULIN SER CALC-MCNC: 2.2 G/DL (ref 1.5–4.5)
GLUCOSE SERPL-MCNC: 114 MG/DL (ref 70–99)
POTASSIUM SERPL-SCNC: 4.2 MMOL/L (ref 3.5–5.2)
PROT SERPL-MCNC: 6.4 G/DL (ref 6–8.5)
SODIUM SERPL-SCNC: 140 MMOL/L (ref 134–144)

## 2024-05-31 ENCOUNTER — OFFICE VISIT (OUTPATIENT)
Dept: CARDIOLOGY | Facility: CLINIC | Age: 69
End: 2024-05-31
Payer: COMMERCIAL

## 2024-05-31 VITALS
WEIGHT: 216 LBS | OXYGEN SATURATION: 96 % | DIASTOLIC BLOOD PRESSURE: 74 MMHG | BODY MASS INDEX: 30.24 KG/M2 | RESPIRATION RATE: 12 BRPM | HEIGHT: 71 IN | HEART RATE: 64 BPM | SYSTOLIC BLOOD PRESSURE: 124 MMHG

## 2024-05-31 DIAGNOSIS — I10 ESSENTIAL HYPERTENSION: ICD-10-CM

## 2024-05-31 DIAGNOSIS — I73.9 PVD (PERIPHERAL VASCULAR DISEASE) WITH CLAUDICATION: ICD-10-CM

## 2024-05-31 DIAGNOSIS — I70.8 LEFT SUBCLAVIAN ARTERY OCCLUSION: ICD-10-CM

## 2024-05-31 DIAGNOSIS — R73.03 PREDIABETES: ICD-10-CM

## 2024-05-31 DIAGNOSIS — E78.5 HYPERLIPIDEMIA LDL GOAL <100: ICD-10-CM

## 2024-05-31 DIAGNOSIS — I65.23 BILATERAL CAROTID ARTERY STENOSIS: Primary | ICD-10-CM

## 2024-05-31 PROCEDURE — 99214 OFFICE O/P EST MOD 30 MIN: CPT | Performed by: INTERNAL MEDICINE

## 2024-05-31 PROCEDURE — 93000 ELECTROCARDIOGRAM COMPLETE: CPT | Performed by: INTERNAL MEDICINE

## 2024-05-31 NOTE — PROGRESS NOTES
[facility] [place]  [facility] CARDIOLOGY  [address] [place] [address]  Dept: [phone]  Dept Fax: [phone]    Khadar Vann  1955    Follow Up Office Visit Note    History of Present Illness:  Khadar Vann is a 69 y.o. male who presents to the clinic for Follow-up.carotid stenosis- He has bilateral carotid stenosis plus bypass left carotid to left subclavia,  denies any major complaints, last carotid doppler no significant disease on Asa 81 mg, he also has PVD by EMMA , but he denies any complaints now, he states before has some legs pain but no now, , he is not smoking, BP is 130.80,  and he is also on Crestor 20 mg plus Zetia, , his lipids LDL is good but Lpa is over 400, , we will try to get repatha approve    The following portions of the patient's history were reviewed and updated as appropriate: allergies, current medications, past family history, past medical history, past social history, past surgical history, and problem list.    Medications:  aspirin  benazepril  Evolocumab solution auto-injector  ezetimibe  Lumigan solution  rosuvastatin    Subjective  No Known Allergies     Past Medical History:   Diagnosis Date    Atherosclerosis of arteries of extremities     Benign essential hypertension     Bilateral carotid artery stenosis     Body mass index (BMI) 33.0-33.9, adult     Diabetes     Dyslipidemia     GERD (gastroesophageal reflux disease)     Hyperlipidemia     Hypertension     Impaired fasting glucose     Mixed hypercholesterolemia and hypertriglyceridemia     Multiple and bilateral precerebral arterial occlusion     Multiple and bilateral precerebral artery stenosis     Occlusion and stenosis of bilateral carotid arteries     Peripheral vascular disease     Pure hypercholesterolemia     Stroke 2010    Tobacco abuse        Past Surgical History:   Procedure Laterality Date    CAROTID ENDARTERECTOMY Right 03/31/2010    CAROTID ENDARTERECTOMY Left  "2010    with subclavian by-pass    SHOULDER ARTHROSCOPY Left        Family History   Problem Relation Age of Onset    Heart disease Father     Hypertension Father     Diabetes Father     Diabetes Sister     Cancer Brother         lung        Social History     Socioeconomic History    Marital status:     Number of children: 2    Highest education level: 12th grade   Tobacco Use    Smoking status: Former     Current packs/day: 0.00     Types: Cigarettes     Quit date:      Years since quittin.4    Smokeless tobacco: Never   Vaping Use    Vaping status: Never Used   Substance and Sexual Activity    Alcohol use: No    Drug use: No    Sexual activity: Defer       Review of Systems   Constitutional: Negative.    HENT: Negative.     Respiratory: Negative.     Cardiovascular: Negative.    Endocrine: Negative.    Genitourinary: Negative.    Musculoskeletal: Negative.    Skin: Negative.    Allergic/Immunologic: Negative.    Neurological: Negative.    Hematological: Negative.    Psychiatric/Behavioral: Negative.       Cardiovascular Procedures    ECHO/MUGA:  STRESS TESTS:   CARDIAC CATH:   DEVICES:   HOLTER:   CT/MRI:   VASCULAR:   CARDIOTHORACIC:     Objective  Vitals:    24 0902   BP: 124/74   BP Location: Right arm   Patient Position: Lying   Cuff Size: Adult   Pulse: 64   Resp: 12   SpO2: 96%   Weight: 98 kg (216 lb)   Height: 180.3 cm (71\")   PainSc: 0-No pain     Body mass index is 30.13 kg/m².     Physical Exam  Vitals reviewed.   Constitutional:       Appearance: Healthy appearance. Not in distress.   Eyes:      Pupils: Pupils are equal, round, and reactive to light.   HENT:    Mouth/Throat:      Pharynx: Oropharynx is clear.   Neck:      Thyroid: Thyroid normal.      Vascular: No JVR. JVD normal.   Pulmonary:      Effort: Pulmonary effort is normal.      Breath sounds: Normal breath sounds. No wheezing. No rhonchi. No rales.   Chest:      Chest wall: Not tender to palpatation. "   Cardiovascular:      PMI at left midclavicular line. Normal rate. Regular rhythm. Normal S1. Normal S2.       Murmurs: There is no murmur.      No gallop.  No click. No rub.   Pulses:     Intact distal pulses.      Carotid: 3+ bilaterally.     Radial: 3+ bilaterally.     Femoral: 3+ bilaterally.     Dorsalis pedis: 3+ bilaterally.     Posterior tibial: 3+ bilaterally.  Edema:     Peripheral edema absent.   Abdominal:      General: Bowel sounds are normal.      Palpations: Abdomen is soft.      Tenderness: There is no abdominal tenderness.   Musculoskeletal: Normal range of motion.         General: No tenderness.      Cervical back: Normal range of motion and neck supple. Skin:     General: Skin is warm and dry.   Neurological:      General: No focal deficit present.      Mental Status: Alert and oriented to person, place and time.        Diagnostic Data    ECG 12 Lead    Date/Time: 5/31/2024 2:57 PM  Performed by: Geo Serrano MD    Authorized by: Geo Serrano MD  Comparison: compared with previous ECG from 2/8/2023  Similar to previous ECG  Rhythm: sinus rhythm and sinus bradycardia  Rate: normal  BPM: 57  QRS axis: normal    Clinical impression: normal ECG        Assessment and Plan  Diagnoses and all orders for this visit:    Bilateral carotid artery stenosis- s/p carotid endarterectomy and also    bypass left to subclavia on ASa    Essential hypertension- BP is 130.80, on Benazepril 20 mg,     Hyperlipidemia LDL goal <100-On crestor and Zetia, plus repatha    Left subclavian artery occlusion- s.p bypass left carotid to left subclavia    PVD (peripheral vascular disease) with claudication- no  complaints now on ASA, moderate disease by BI    Prediabetes    Other orders  -     Evolocumab (REPATHA) solution auto-injector SureClick injection; Inject 1 mL under the skin into the appropriate area as directed Every 14 (Fourteen) Days.         Return in about 6 months (around 11/30/2024) for  Recheck with Dr. Serrano.    Geo Serrano MD  05/31/2024

## 2024-06-18 ENCOUNTER — TELEPHONE (OUTPATIENT)
Dept: CARDIOLOGY | Facility: CLINIC | Age: 69
End: 2024-06-18
Payer: COMMERCIAL

## 2024-06-18 NOTE — TELEPHONE ENCOUNTER
MISSING INFORMATION FOR PRIOR AUTH FOR REPATHA    - CHART NOTES OR TYPED LETTER OF MEDICAL NECESSITY     - DOCUMENTING THE TRIAL AND FAILURE OF TWO GENERIC STATINS FOR AT LEAST 4 WEEKS EACH    - OR DOCUMENTATION A TRUE CONTRAINDICATION TO STATIN     FAX - [phone]     SPOKE WITH DILLON

## 2024-06-27 ENCOUNTER — TELEPHONE (OUTPATIENT)
Dept: CARDIOLOGY | Facility: CLINIC | Age: 69
End: 2024-06-27
Payer: COMMERCIAL

## 2024-06-27 NOTE — TELEPHONE ENCOUNTER
BRITTANIE     CASE NUMBER [id number]     [id number]   [id number]   EITHER SEND CHART NOTES 4 WEEKS EACH WITH STATIN    MEDICAL REASON VALIDATION FOR MEDICATION     [phone]   - CALL BACK IF NEEDED

## 2024-12-30 RX ORDER — BENAZEPRIL HYDROCHLORIDE 20 MG/1
20 TABLET ORAL DAILY
Qty: 90 TABLET | Refills: 3 | Status: SHIPPED | OUTPATIENT
Start: 2024-12-30

## 2025-01-03 ENCOUNTER — CLINICAL SUPPORT (OUTPATIENT)
Dept: CARDIOLOGY | Facility: CLINIC | Age: 70
End: 2025-01-03
Payer: MEDICARE

## 2025-01-03 DIAGNOSIS — I70.8 LEFT SUBCLAVIAN ARTERY OCCLUSION: ICD-10-CM

## 2025-01-03 DIAGNOSIS — I10 ESSENTIAL HYPERTENSION: Primary | ICD-10-CM

## 2025-01-03 DIAGNOSIS — E78.5 HYPERLIPIDEMIA LDL GOAL <100: ICD-10-CM

## 2025-01-03 DIAGNOSIS — I73.9 PVD (PERIPHERAL VASCULAR DISEASE) WITH CLAUDICATION: ICD-10-CM

## 2025-01-03 DIAGNOSIS — R73.03 PREDIABETES: ICD-10-CM

## 2025-01-03 PROCEDURE — 36415 COLL VENOUS BLD VENIPUNCTURE: CPT | Performed by: INTERNAL MEDICINE

## 2025-01-03 NOTE — PROGRESS NOTES
Venipuncture Blood Specimen Collection  Venipuncture performed in clinic by Akiko Lord RN with good hemostasis. Patient tolerated the procedure well without complications.   01/03/25   Akiko Lord RN

## 2025-01-04 LAB
ALBUMIN SERPL-MCNC: 4.2 G/DL (ref 3.9–4.9)
ALP SERPL-CCNC: 87 IU/L (ref 44–121)
ALT SERPL-CCNC: 8 IU/L (ref 0–44)
AST SERPL-CCNC: 14 IU/L (ref 0–40)
BASOPHILS # BLD AUTO: 0.1 X10E3/UL (ref 0–0.2)
BASOPHILS NFR BLD AUTO: 1 %
BILIRUB SERPL-MCNC: 0.4 MG/DL (ref 0–1.2)
BUN SERPL-MCNC: 9 MG/DL (ref 8–27)
BUN/CREAT SERPL: 8 (ref 10–24)
CALCIUM SERPL-MCNC: 9.6 MG/DL (ref 8.6–10.2)
CHLORIDE SERPL-SCNC: 106 MMOL/L (ref 96–106)
CHOLEST SERPL-MCNC: 100 MG/DL (ref 100–199)
CO2 SERPL-SCNC: 24 MMOL/L (ref 20–29)
CREAT SERPL-MCNC: 1.14 MG/DL (ref 0.76–1.27)
EGFRCR SERPLBLD CKD-EPI 2021: 70 ML/MIN/1.73
EOSINOPHIL # BLD AUTO: 0.2 X10E3/UL (ref 0–0.4)
EOSINOPHIL NFR BLD AUTO: 2 %
ERYTHROCYTE [DISTWIDTH] IN BLOOD BY AUTOMATED COUNT: 12.7 % (ref 11.6–15.4)
GLOBULIN SER CALC-MCNC: 2.3 G/DL (ref 1.5–4.5)
GLUCOSE SERPL-MCNC: 106 MG/DL (ref 70–99)
HBA1C MFR BLD: 5.6 % (ref 4.8–5.6)
HCT VFR BLD AUTO: 43 % (ref 37.5–51)
HDLC SERPL-MCNC: 30 MG/DL
HGB BLD-MCNC: 14.4 G/DL (ref 13–17.7)
IMM GRANULOCYTES # BLD AUTO: 0 X10E3/UL (ref 0–0.1)
IMM GRANULOCYTES NFR BLD AUTO: 0 %
LDLC SERPL CALC-MCNC: 52 MG/DL (ref 0–99)
LYMPHOCYTES # BLD AUTO: 1.3 X10E3/UL (ref 0.7–3.1)
LYMPHOCYTES NFR BLD AUTO: 16 %
MCH RBC QN AUTO: 28.5 PG (ref 26.6–33)
MCHC RBC AUTO-ENTMCNC: 33.5 G/DL (ref 31.5–35.7)
MCV RBC AUTO: 85 FL (ref 79–97)
MONOCYTES # BLD AUTO: 0.6 X10E3/UL (ref 0.1–0.9)
MONOCYTES NFR BLD AUTO: 7 %
NEUTROPHILS # BLD AUTO: 6.1 X10E3/UL (ref 1.4–7)
NEUTROPHILS NFR BLD AUTO: 74 %
PLATELET # BLD AUTO: 253 X10E3/UL (ref 150–450)
POTASSIUM SERPL-SCNC: 3.9 MMOL/L (ref 3.5–5.2)
PROT SERPL-MCNC: 6.5 G/DL (ref 6–8.5)
RBC # BLD AUTO: 5.06 X10E6/UL (ref 4.14–5.8)
SODIUM SERPL-SCNC: 144 MMOL/L (ref 134–144)
TRIGL SERPL-MCNC: 92 MG/DL (ref 0–149)
VLDLC SERPL CALC-MCNC: 18 MG/DL (ref 5–40)
WBC # BLD AUTO: 8.3 X10E3/UL (ref 3.4–10.8)

## 2025-01-09 ENCOUNTER — OFFICE VISIT (OUTPATIENT)
Dept: CARDIOLOGY | Facility: CLINIC | Age: 70
End: 2025-01-09
Payer: MEDICARE

## 2025-01-09 VITALS
WEIGHT: 221 LBS | HEIGHT: 71 IN | TEMPERATURE: 98 F | SYSTOLIC BLOOD PRESSURE: 150 MMHG | OXYGEN SATURATION: 98 % | DIASTOLIC BLOOD PRESSURE: 80 MMHG | BODY MASS INDEX: 30.94 KG/M2 | HEART RATE: 78 BPM | RESPIRATION RATE: 16 BRPM

## 2025-01-09 DIAGNOSIS — I73.9 PVD (PERIPHERAL VASCULAR DISEASE) WITH CLAUDICATION: ICD-10-CM

## 2025-01-09 DIAGNOSIS — I70.8 LEFT SUBCLAVIAN ARTERY OCCLUSION: ICD-10-CM

## 2025-01-09 DIAGNOSIS — I10 ESSENTIAL HYPERTENSION: ICD-10-CM

## 2025-01-09 DIAGNOSIS — I65.23 BILATERAL CAROTID ARTERY STENOSIS: Primary | ICD-10-CM

## 2025-01-09 DIAGNOSIS — E78.5 HYPERLIPIDEMIA LDL GOAL <100: ICD-10-CM

## 2025-01-09 RX ORDER — BENAZEPRIL HYDROCHLORIDE 40 MG/1
40 TABLET ORAL DAILY
Qty: 90 TABLET | Refills: 3 | Status: SHIPPED | OUTPATIENT
Start: 2025-01-09

## 2025-01-09 NOTE — PROGRESS NOTES
[facility] [place]  [facility] CARDIOLOGY  [address] [place] [address]  Dept: [phone]  Dept Fax: [phone]  to  Khadar Vann  1955    Follow Up Office Visit Note    History of Present Illness:  Khadar Vann is a 69 y.o. male who presents to the clinic for Follow-up.carotid stenosis- has mild bilateral by last doppler, he did have right carotid endarterectomy and also left carotid bypass to left subclavia, and the grafts has been occluded since 2015. Denies any CP, SOB, palpitations BP is 150.80, will increase Benazepril to 40 mg, might need second meds, advised to  come back in 1 month to check the BP    The following portions of the patient's history were reviewed and updated as appropriate: allergies, current medications, past family history, past medical history, past social history, past surgical history, and problem list.    Medications:  aspirin  benazepril  Evolocumab solution auto-injector  ezetimibe  Lumigan solution  rosuvastatin    Subjective  No Known Allergies     Past Medical History:   Diagnosis Date   • Atherosclerosis of arteries of extremities    • Benign essential hypertension    • Bilateral carotid artery stenosis    • Body mass index (BMI) 33.0-33.9, adult    • Diabetes    • Dyslipidemia    • GERD (gastroesophageal reflux disease)    • Hyperlipidemia    • Hypertension    • Impaired fasting glucose    • Mixed hypercholesterolemia and hypertriglyceridemia    • Multiple and bilateral precerebral arterial occlusion    • Multiple and bilateral precerebral artery stenosis    • Occlusion and stenosis of bilateral carotid arteries    • Peripheral vascular disease    • Pure hypercholesterolemia    • Stroke 2010   • Tobacco abuse        Past Surgical History:   Procedure Laterality Date   • CAROTID ENDARTERECTOMY Right 03/31/2010   • CAROTID ENDARTERECTOMY Left 04/26/2010    with subclavian by-pass   • SHOULDER ARTHROSCOPY Left        Family History  "  Problem Relation Age of Onset   • Heart disease Father    • Hypertension Father    • Diabetes Father    • Diabetes Sister    • Cancer Brother         lung        Social History     Socioeconomic History   • Marital status:    • Number of children: 2   • Highest education level: 12th grade   Tobacco Use   • Smoking status: Former     Current packs/day: 0.00     Types: Cigarettes     Quit date: 2010     Years since quitting: 15.0   • Smokeless tobacco: Never   Vaping Use   • Vaping status: Never Used   Substance and Sexual Activity   • Alcohol use: No   • Drug use: No   • Sexual activity: Defer       Review of Systems   Constitutional: Negative.    HENT: Negative.     Respiratory: Negative.     Cardiovascular: Negative.    Endocrine: Negative.    Genitourinary: Negative.    Musculoskeletal: Negative.    Skin: Negative.    Allergic/Immunologic: Negative.    Neurological: Negative.    Hematological: Negative.    Psychiatric/Behavioral: Negative.       Cardiovascular Procedures    ECHO/MUGA:  STRESS TESTS:   CARDIAC CATH:   DEVICES:   HOLTER:   CT/MRI:   VASCULAR:   CARDIOTHORACIC:     Objective  Vitals:    01/09/25 1013 01/09/25 1036   BP: 152/76 150/80   BP Location: Right arm    Patient Position: Lying    Cuff Size: Adult    Pulse: 78    Resp: 16    Temp: 98 °F (36.7 °C)    TempSrc: Infrared    SpO2: 98%    Weight: 100 kg (221 lb)    Height: 180.3 cm (71\")    PainSc: 0-No pain      Body mass index is 30.82 kg/m².     Physical Exam  Vitals reviewed.   Constitutional:       Appearance: Healthy appearance. Not in distress.   Eyes:      Pupils: Pupils are equal, round, and reactive to light.   HENT:    Mouth/Throat:      Pharynx: Oropharynx is clear.   Neck:      Thyroid: Thyroid normal.      Vascular: No JVR. JVD normal.   Pulmonary:      Effort: Pulmonary effort is normal.      Breath sounds: Normal breath sounds. No wheezing. No rhonchi. No rales.   Chest:      Chest wall: Not tender to palpatation. "   Cardiovascular:      PMI at left midclavicular line. Normal rate. Regular rhythm. Normal S1. Normal S2.       Murmurs: There is no murmur.      No gallop.  No click. No rub.   Pulses:     Intact distal pulses.      Carotid: 3+ bilaterally.     Radial: 3+ bilaterally.     Femoral: 3+ bilaterally.     Dorsalis pedis: 3+ bilaterally.     Posterior tibial: 3+ bilaterally.  Edema:     Peripheral edema absent.   Abdominal:      General: Bowel sounds are normal.      Palpations: Abdomen is soft.      Tenderness: There is no abdominal tenderness.   Musculoskeletal: Normal range of motion.         General: No tenderness.      Cervical back: Normal range of motion and neck supple. Skin:     General: Skin is warm and dry.   Neurological:      General: No focal deficit present.      Mental Status: Alert and oriented to person, place and time.        Diagnostic Data    ECG 12 Lead    Date/Time: 1/9/2025 10:48 AM  Performed by: Geo Serrano MD    Authorized by: Geo Serrano MD  Comparison: compared with previous ECG from 5/31/2024  Similar to previous ECG  Rhythm: sinus rhythm  Rate: normal  BPM: 62  QRS axis: normal    Clinical impression: normal ECG        Assessment and Plan  Diagnoses and all orders for this visit:    Bilateral carotid artery stenosis- mild disease by doppler, has prior right carotid endarterectomy and also has a left  carotid bypass to subclavia occluded, since 2015, his BP in the left arm is low 100.60 no complaints    Essential hypertension- BP is high 150.80 will increase Benazepril to 40 mg, will come back in 1 month to check BP might need second med    Hyperlipidemia LDL goal <100- Ldl is good but Lpa is very high over 400 he refuses to use treatment Repatha    Left subclavian artery occlusion- Bypass to left subclavia from carotid also occluded since 2015    PVD (peripheral vascular disease) with claudication- no claudication,    Other orders  -     benazepril (LOTENSIN) 40 MG  tablet; Take 1 tablet by mouth Daily.         Return in about 6 months (around 7/9/2025) for Recheck with Dr. Serrano.    Geo Serrano MD  01/09/2025

## 2025-02-07 ENCOUNTER — CLINICAL SUPPORT (OUTPATIENT)
Dept: CARDIOLOGY | Facility: CLINIC | Age: 70
End: 2025-02-07
Payer: MEDICARE

## 2025-02-07 VITALS — DIASTOLIC BLOOD PRESSURE: 70 MMHG | SYSTOLIC BLOOD PRESSURE: 156 MMHG

## 2025-02-07 RX ORDER — AMLODIPINE BESYLATE 5 MG/1
5 TABLET ORAL DAILY
Qty: 90 TABLET | Refills: 3 | Status: SHIPPED | OUTPATIENT
Start: 2025-02-07

## 2025-02-10 RX ORDER — ROSUVASTATIN CALCIUM 20 MG/1
20 TABLET, COATED ORAL DAILY
Qty: 90 TABLET | Refills: 3 | Status: SHIPPED | OUTPATIENT
Start: 2025-02-10

## 2025-02-14 NOTE — PROGRESS NOTES
Patient in office for BP check ,reviewed by Dr Serrano. Medication changes made and discussed with patient who verbalized understanding

## 2025-03-04 RX ORDER — ROSUVASTATIN CALCIUM 20 MG/1
20 TABLET, COATED ORAL DAILY
Qty: 90 TABLET | Refills: 3 | OUTPATIENT
Start: 2025-03-04

## 2025-03-04 NOTE — TELEPHONE ENCOUNTER
Too soon-- sent 02- quantity 90 with 3 refills     Rx Refill Note  Requested Prescriptions     Refused Prescriptions Disp Refills    rosuvastatin (CRESTOR) 20 MG tablet [Pharmacy Med Name: ROSUVASTATIN 20MG TABLETS] 90 tablet 3     Sig: Take 1 tablet by mouth Daily.      Last office visit with prescribing clinician: 1/9/2025   Last telemedicine visit with prescribing clinician: Visit date not found   Next office visit with prescribing clinician: 7/9/2025                         Would you like a call back once the refill request has been completed: [] Yes [] No    If the office needs to give you a call back, can they leave a voicemail: [] Yes [] No    Gaye Sheffield MA  03/04/25, 08:46 EST

## 2025-03-10 RX ORDER — EZETIMIBE 10 MG/1
10 TABLET ORAL DAILY
Qty: 90 TABLET | Refills: 3 | Status: SHIPPED | OUTPATIENT
Start: 2025-03-10

## 2025-06-27 ENCOUNTER — CLINICAL SUPPORT (OUTPATIENT)
Dept: CARDIOLOGY | Facility: CLINIC | Age: 70
End: 2025-06-27
Payer: MEDICARE

## 2025-06-27 VITALS
HEART RATE: 67 BPM | SYSTOLIC BLOOD PRESSURE: 119 MMHG | WEIGHT: 231 LBS | BODY MASS INDEX: 32.22 KG/M2 | DIASTOLIC BLOOD PRESSURE: 64 MMHG

## 2025-06-27 DIAGNOSIS — R73.03 PREDIABETES: ICD-10-CM

## 2025-06-27 DIAGNOSIS — I10 ESSENTIAL HYPERTENSION: Primary | ICD-10-CM

## 2025-06-27 DIAGNOSIS — I73.9 PVD (PERIPHERAL VASCULAR DISEASE) WITH CLAUDICATION: ICD-10-CM

## 2025-06-27 DIAGNOSIS — E78.5 HYPERLIPIDEMIA LDL GOAL <100: ICD-10-CM

## 2025-06-27 PROCEDURE — 36415 COLL VENOUS BLD VENIPUNCTURE: CPT | Performed by: INTERNAL MEDICINE

## 2025-06-27 NOTE — PROGRESS NOTES
Venipuncture Blood Specimen Collection  Venipuncture performed in clinic by Sabrina Perez with good hemostasis. Patient tolerated the procedure well without complications.   06/27/25   Sabrina Perez

## 2025-06-28 LAB
ALBUMIN SERPL-MCNC: 4.4 G/DL (ref 3.9–4.9)
ALP SERPL-CCNC: 88 IU/L (ref 44–121)
ALT SERPL-CCNC: 12 IU/L (ref 0–44)
AST SERPL-CCNC: 17 IU/L (ref 0–40)
BASOPHILS # BLD AUTO: 0.1 X10E3/UL (ref 0–0.2)
BASOPHILS NFR BLD AUTO: 1 %
BILIRUB SERPL-MCNC: 0.5 MG/DL (ref 0–1.2)
BUN SERPL-MCNC: 18 MG/DL (ref 8–27)
BUN/CREAT SERPL: 13 (ref 10–24)
CALCIUM SERPL-MCNC: 9.8 MG/DL (ref 8.6–10.2)
CHLORIDE SERPL-SCNC: 104 MMOL/L (ref 96–106)
CHOLEST SERPL-MCNC: 99 MG/DL (ref 100–199)
CK SERPL-CCNC: 54 U/L (ref 41–331)
CO2 SERPL-SCNC: 22 MMOL/L (ref 20–29)
CREAT SERPL-MCNC: 1.43 MG/DL (ref 0.76–1.27)
EGFRCR SERPLBLD CKD-EPI 2021: 53 ML/MIN/1.73
EOSINOPHIL # BLD AUTO: 0.2 X10E3/UL (ref 0–0.4)
EOSINOPHIL NFR BLD AUTO: 2 %
ERYTHROCYTE [DISTWIDTH] IN BLOOD BY AUTOMATED COUNT: 13 % (ref 11.6–15.4)
GLOBULIN SER CALC-MCNC: 2.3 G/DL (ref 1.5–4.5)
GLUCOSE SERPL-MCNC: 115 MG/DL (ref 70–99)
HBA1C MFR BLD: 5.8 % (ref 4.8–5.6)
HCT VFR BLD AUTO: 45.3 % (ref 37.5–51)
HDLC SERPL-MCNC: 27 MG/DL
HGB BLD-MCNC: 14.8 G/DL (ref 13–17.7)
IMM GRANULOCYTES # BLD AUTO: 0 X10E3/UL (ref 0–0.1)
IMM GRANULOCYTES NFR BLD AUTO: 0 %
LDLC SERPL CALC-MCNC: 51 MG/DL (ref 0–99)
LYMPHOCYTES # BLD AUTO: 1.4 X10E3/UL (ref 0.7–3.1)
LYMPHOCYTES NFR BLD AUTO: 18 %
MCH RBC QN AUTO: 29.3 PG (ref 26.6–33)
MCHC RBC AUTO-ENTMCNC: 32.7 G/DL (ref 31.5–35.7)
MCV RBC AUTO: 90 FL (ref 79–97)
MONOCYTES # BLD AUTO: 0.7 X10E3/UL (ref 0.1–0.9)
MONOCYTES NFR BLD AUTO: 9 %
NEUTROPHILS # BLD AUTO: 5.4 X10E3/UL (ref 1.4–7)
NEUTROPHILS NFR BLD AUTO: 70 %
PLATELET # BLD AUTO: 236 X10E3/UL (ref 150–450)
POTASSIUM SERPL-SCNC: 4.6 MMOL/L (ref 3.5–5.2)
PROT SERPL-MCNC: 6.7 G/DL (ref 6–8.5)
RBC # BLD AUTO: 5.05 X10E6/UL (ref 4.14–5.8)
SODIUM SERPL-SCNC: 140 MMOL/L (ref 134–144)
TRIGL SERPL-MCNC: 110 MG/DL (ref 0–149)
VLDLC SERPL CALC-MCNC: 21 MG/DL (ref 5–40)
WBC # BLD AUTO: 7.7 X10E3/UL (ref 3.4–10.8)

## 2025-07-09 ENCOUNTER — OFFICE VISIT (OUTPATIENT)
Dept: CARDIOLOGY | Facility: CLINIC | Age: 70
End: 2025-07-09
Payer: MEDICARE

## 2025-07-09 VITALS
WEIGHT: 228 LBS | DIASTOLIC BLOOD PRESSURE: 72 MMHG | SYSTOLIC BLOOD PRESSURE: 124 MMHG | HEART RATE: 84 BPM | OXYGEN SATURATION: 99 % | RESPIRATION RATE: 18 BRPM | BODY MASS INDEX: 31.92 KG/M2 | HEIGHT: 71 IN

## 2025-07-09 DIAGNOSIS — I73.9 PVD (PERIPHERAL VASCULAR DISEASE) WITH CLAUDICATION: ICD-10-CM

## 2025-07-09 DIAGNOSIS — I10 ESSENTIAL HYPERTENSION: Primary | ICD-10-CM

## 2025-07-09 DIAGNOSIS — E78.5 HYPERLIPIDEMIA LDL GOAL <100: ICD-10-CM

## 2025-07-09 DIAGNOSIS — I70.8 LEFT SUBCLAVIAN ARTERY OCCLUSION: ICD-10-CM

## 2025-07-09 DIAGNOSIS — I65.23 BILATERAL CAROTID ARTERY STENOSIS: ICD-10-CM

## 2025-07-09 PROCEDURE — 99214 OFFICE O/P EST MOD 30 MIN: CPT | Performed by: INTERNAL MEDICINE

## 2025-07-09 PROCEDURE — 1160F RVW MEDS BY RX/DR IN RCRD: CPT | Performed by: INTERNAL MEDICINE

## 2025-07-09 PROCEDURE — 3074F SYST BP LT 130 MM HG: CPT | Performed by: INTERNAL MEDICINE

## 2025-07-09 PROCEDURE — 3078F DIAST BP <80 MM HG: CPT | Performed by: INTERNAL MEDICINE

## 2025-07-09 PROCEDURE — 1159F MED LIST DOCD IN RCRD: CPT | Performed by: INTERNAL MEDICINE

## 2025-07-09 PROCEDURE — 93000 ELECTROCARDIOGRAM COMPLETE: CPT | Performed by: INTERNAL MEDICINE

## 2025-07-09 NOTE — PROGRESS NOTES
[facility] [place]  [facility] CARDIOLOGY  [address] [place] [address]  Dept: [phone]  Dept Fax: [phone]    Khadar Vann  1955    Follow Up Office Visit Note    History of Present Illness:  Khadar Vann is a 70 y.o. male who presents to the clinic for Hypertension- His BP is better, after we added Amlodipine 5mg, he is also on Benazepril  his Creatinine now uis up, as he is vasculopath he might have renal artery stenosis, will get a renal US, advised tod rink extra fluids,BP is 135.80    The following portions of the patient's history were reviewed and updated as appropriate: allergies, current medications, past family history, past medical history, past social history, past surgical history, and problem list.    Medications:  amLODIPine  aspirin  benazepril  ezetimibe  Lumigan solution  rosuvastatin    Subjective  No Known Allergies     Past Medical History:   Diagnosis Date   • Atherosclerosis of arteries of extremities    • Benign essential hypertension    • Bilateral carotid artery stenosis    • Body mass index (BMI) 33.0-33.9, adult    • Diabetes    • Dyslipidemia    • GERD (gastroesophageal reflux disease)    • Hyperlipidemia    • Hypertension    • Impaired fasting glucose    • Mixed hypercholesterolemia and hypertriglyceridemia    • Multiple and bilateral precerebral arterial occlusion    • Multiple and bilateral precerebral artery stenosis    • Occlusion and stenosis of bilateral carotid arteries    • Peripheral vascular disease    • Pure hypercholesterolemia    • Stroke 2010   • Tobacco abuse        Past Surgical History:   Procedure Laterality Date   • CAROTID ENDARTERECTOMY Right 03/31/2010   • CAROTID ENDARTERECTOMY Left 04/26/2010    with subclavian by-pass   • SHOULDER ARTHROSCOPY Left        Family History   Problem Relation Age of Onset   • Heart disease Father    • Hypertension Father    • Diabetes Father    • Diabetes Sister    • Cancer  "Brother         lung        Social History     Socioeconomic History   • Marital status:    • Number of children: 2   • Highest education level: 12th grade   Tobacco Use   • Smoking status: Former     Current packs/day: 0.00     Types: Cigarettes     Quit date: 2010     Years since quitting: 15.5   • Smokeless tobacco: Never   Vaping Use   • Vaping status: Never Used   Substance and Sexual Activity   • Alcohol use: No   • Drug use: No   • Sexual activity: Defer       Review of Systems   Constitutional: Negative.    HENT: Negative.     Respiratory: Negative.     Cardiovascular: Negative.    Endocrine: Negative.    Genitourinary: Negative.    Musculoskeletal: Negative.    Skin: Negative.    Allergic/Immunologic: Negative.    Neurological: Negative.    Hematological: Negative.    Psychiatric/Behavioral: Negative.       Cardiovascular Procedures    ECHO/MUGA:  STRESS TESTS:   CARDIAC CATH:   DEVICES:   HOLTER:   CT/MRI:   VASCULAR:   CARDIOTHORACIC:     Objective  Vitals:    07/09/25 0827   BP: 124/72   BP Location: Right arm   Patient Position: Sitting   Cuff Size: Adult   Pulse: 84   Resp: 18   SpO2: 99%   Weight: 103 kg (228 lb)   Height: 180.3 cm (71\")   PainSc: 0-No pain     Body mass index is 31.8 kg/m².     Physical Exam  Vitals reviewed.   Constitutional:       Appearance: Healthy appearance. Not in distress.   Neck:      Vascular: No JVR. JVD normal.   Pulmonary:      Effort: Pulmonary effort is normal.      Breath sounds: Normal breath sounds. No wheezing. No rhonchi. No rales.   Chest:      Chest wall: Not tender to palpatation.   Cardiovascular:      PMI at left midclavicular line. Normal rate. Regular rhythm. Normal S1. Normal S2.       Murmurs: There is no murmur.      No gallop.  No click. No rub.   Pulses:     Intact distal pulses.   Edema:     Peripheral edema absent.   Abdominal:      General: Bowel sounds are normal.      Palpations: Abdomen is soft.      Tenderness: There is no abdominal " tenderness.   Musculoskeletal: Normal range of motion.         General: No tenderness. Skin:     General: Skin is warm and dry.   Neurological:      General: No focal deficit present.      Mental Status: Alert and oriented to person, place and time.        Diagnostic Data    ECG 12 Lead    Date/Time: 7/9/2025 9:13 AM  Performed by: Geo Serrano MD    Authorized by: Geo Serrano MD  Comparison: compared with previous ECG from 1/9/2025  Similar to previous ECG  Rhythm: sinus rhythm  Rate: normal  BPM: 66  QRS axis: normal    Clinical impression: normal ECG        Assessment and Plan  Diagnoses and all orders for this visit:    Essential hypertension- BP is better 135.80 on Amlodipine 5mg and also Benazepril 40 mg,a s his Creatinine is elevated, will advised to increase fluids intake plus get a renal vascular US  -     Duplex Renal Artery - Bilateral Complete CAR; Future    Hyperlipidemia LDL goal <100- On Crestor and Zetia, lipids are good but has very high Lpa needs repatha    Left subclavian artery occlusion- the Bypass to left subclavia has been occluded    PVD (peripheral vascular disease) with claudication- Moderate disease, by EMMA, advised to walk    Bilateral carotid artery stenosis-Mild disease, prior right carotid endarterectomy and also has left carotid bypass to left subclavia         Return in about 6 months (around 1/9/2026) for Recheck with Dr. Serrano.    Geo Serrano MD  07/09/2025

## 2025-07-15 ENCOUNTER — TELEPHONE (OUTPATIENT)
Dept: CARDIOLOGY | Facility: CLINIC | Age: 70
End: 2025-07-15
Payer: MEDICARE

## 2025-07-15 NOTE — TELEPHONE ENCOUNTER
Patient cancelled appointment. Per patient, does not want to do at this time, and wants to discuss at next appointment with Dr Serrano.

## 2025-07-15 NOTE — TELEPHONE ENCOUNTER
RENAL US scheduled at  Fri, 7/18 12:30 arrival for registration [address], [address] first floor for registration. Nothing to eat or drink after midnight. Left message for patient to call back and confirm. HUB to transfer call to the office, and also ok to relay message.